# Patient Record
Sex: FEMALE | Race: BLACK OR AFRICAN AMERICAN | Employment: FULL TIME | URBAN - METROPOLITAN AREA
[De-identification: names, ages, dates, MRNs, and addresses within clinical notes are randomized per-mention and may not be internally consistent; named-entity substitution may affect disease eponyms.]

---

## 2020-11-15 ENCOUNTER — HOSPITAL ENCOUNTER (EMERGENCY)
Age: 45
Discharge: HOME | End: 2020-11-15
Attending: EMERGENCY MEDICINE
Payer: MEDICAID

## 2020-11-15 ENCOUNTER — APPOINTMENT (OUTPATIENT)
Dept: CT IMAGING | Age: 45
End: 2020-11-15
Attending: PHYSICIAN ASSISTANT
Payer: MEDICAID

## 2020-11-15 ENCOUNTER — APPOINTMENT (OUTPATIENT)
Dept: GENERAL RADIOLOGY | Age: 45
End: 2020-11-15
Attending: PHYSICIAN ASSISTANT
Payer: MEDICAID

## 2020-11-15 VITALS
TEMPERATURE: 97.3 F | HEART RATE: 65 BPM | DIASTOLIC BLOOD PRESSURE: 84 MMHG | BODY MASS INDEX: 44.9 KG/M2 | HEIGHT: 64 IN | SYSTOLIC BLOOD PRESSURE: 126 MMHG | RESPIRATION RATE: 19 BRPM | OXYGEN SATURATION: 100 % | WEIGHT: 263 LBS

## 2020-11-15 DIAGNOSIS — R07.9 CHEST PAIN, UNSPECIFIED TYPE: Primary | ICD-10-CM

## 2020-11-15 LAB
ALBUMIN SERPL-MCNC: 3.6 G/DL (ref 3.4–5)
ALBUMIN/GLOB SERPL: 0.9 {RATIO} (ref 0.8–1.7)
ALP SERPL-CCNC: 133 U/L (ref 45–117)
ALT SERPL-CCNC: 23 U/L (ref 13–56)
ANION GAP SERPL CALC-SCNC: 3 MMOL/L (ref 3–18)
AST SERPL-CCNC: 14 U/L (ref 10–38)
ATRIAL RATE: 65 BPM
BASOPHILS # BLD: 0 K/UL (ref 0–0.1)
BASOPHILS NFR BLD: 0 % (ref 0–2)
BILIRUB SERPL-MCNC: 0.7 MG/DL (ref 0.2–1)
BUN SERPL-MCNC: 13 MG/DL (ref 7–18)
BUN/CREAT SERPL: 14 (ref 12–20)
CALCIUM SERPL-MCNC: 9 MG/DL (ref 8.5–10.1)
CALCULATED P AXIS, ECG09: 20 DEGREES
CALCULATED R AXIS, ECG10: 57 DEGREES
CALCULATED T AXIS, ECG11: 18 DEGREES
CHLORIDE SERPL-SCNC: 104 MMOL/L (ref 100–111)
CK MB CFR SERPL CALC: NORMAL % (ref 0–4)
CK MB SERPL-MCNC: <1 NG/ML (ref 5–25)
CK SERPL-CCNC: 89 U/L (ref 26–192)
CO2 SERPL-SCNC: 33 MMOL/L (ref 21–32)
CREAT SERPL-MCNC: 0.91 MG/DL (ref 0.6–1.3)
D DIMER PPP FEU-MCNC: 1.18 UG/ML(FEU)
DIAGNOSIS, 93000: NORMAL
DIFFERENTIAL METHOD BLD: ABNORMAL
EOSINOPHIL # BLD: 0.1 K/UL (ref 0–0.4)
EOSINOPHIL NFR BLD: 2 % (ref 0–5)
ERYTHROCYTE [DISTWIDTH] IN BLOOD BY AUTOMATED COUNT: 14.8 % (ref 11.6–14.5)
GLOBULIN SER CALC-MCNC: 4.2 G/DL (ref 2–4)
GLUCOSE SERPL-MCNC: 89 MG/DL (ref 74–99)
HCT VFR BLD AUTO: 44 % (ref 35–45)
HGB BLD-MCNC: 14.6 G/DL (ref 12–16)
LYMPHOCYTES # BLD: 2.5 K/UL (ref 0.9–3.6)
LYMPHOCYTES NFR BLD: 29 % (ref 21–52)
MCH RBC QN AUTO: 30.5 PG (ref 24–34)
MCHC RBC AUTO-ENTMCNC: 33.2 G/DL (ref 31–37)
MCV RBC AUTO: 92.1 FL (ref 74–97)
MONOCYTES # BLD: 0.6 K/UL (ref 0.05–1.2)
MONOCYTES NFR BLD: 7 % (ref 3–10)
NEUTS SEG # BLD: 5.3 K/UL (ref 1.8–8)
NEUTS SEG NFR BLD: 62 % (ref 40–73)
P-R INTERVAL, ECG05: 178 MS
PLATELET # BLD AUTO: 336 K/UL (ref 135–420)
PMV BLD AUTO: 8.4 FL (ref 9.2–11.8)
POTASSIUM SERPL-SCNC: 3.7 MMOL/L (ref 3.5–5.5)
PROT SERPL-MCNC: 7.8 G/DL (ref 6.4–8.2)
Q-T INTERVAL, ECG07: 406 MS
QRS DURATION, ECG06: 86 MS
QTC CALCULATION (BEZET), ECG08: 422 MS
RBC # BLD AUTO: 4.78 M/UL (ref 4.2–5.3)
SODIUM SERPL-SCNC: 140 MMOL/L (ref 136–145)
TROPONIN I SERPL-MCNC: <0.02 NG/ML (ref 0–0.04)
VENTRICULAR RATE, ECG03: 65 BPM
WBC # BLD AUTO: 8.7 K/UL (ref 4.6–13.2)

## 2020-11-15 PROCEDURE — 99285 EMERGENCY DEPT VISIT HI MDM: CPT

## 2020-11-15 PROCEDURE — 74011000636 HC RX REV CODE- 636: Performed by: EMERGENCY MEDICINE

## 2020-11-15 PROCEDURE — 96374 THER/PROPH/DIAG INJ IV PUSH: CPT

## 2020-11-15 PROCEDURE — 93005 ELECTROCARDIOGRAM TRACING: CPT

## 2020-11-15 PROCEDURE — 71045 X-RAY EXAM CHEST 1 VIEW: CPT

## 2020-11-15 PROCEDURE — 80053 COMPREHEN METABOLIC PANEL: CPT

## 2020-11-15 PROCEDURE — 85025 COMPLETE CBC W/AUTO DIFF WBC: CPT

## 2020-11-15 PROCEDURE — 82550 ASSAY OF CK (CPK): CPT

## 2020-11-15 PROCEDURE — 71275 CT ANGIOGRAPHY CHEST: CPT

## 2020-11-15 PROCEDURE — 74011250636 HC RX REV CODE- 250/636: Performed by: PHYSICIAN ASSISTANT

## 2020-11-15 PROCEDURE — 85379 FIBRIN DEGRADATION QUANT: CPT

## 2020-11-15 RX ORDER — ACETAMINOPHEN AND CODEINE PHOSPHATE 300; 30 MG/1; MG/1
1 TABLET ORAL
Qty: 12 TAB | Refills: 0 | Status: SHIPPED | OUTPATIENT
Start: 2020-11-15 | End: 2020-11-18

## 2020-11-15 RX ORDER — KETOROLAC TROMETHAMINE 30 MG/ML
30 INJECTION, SOLUTION INTRAMUSCULAR; INTRAVENOUS
Status: COMPLETED | OUTPATIENT
Start: 2020-11-15 | End: 2020-11-15

## 2020-11-15 RX ADMIN — IOPAMIDOL 100 ML: 755 INJECTION, SOLUTION INTRAVENOUS at 16:15

## 2020-11-15 RX ADMIN — KETOROLAC TROMETHAMINE 30 MG: 30 INJECTION, SOLUTION INTRAMUSCULAR at 15:36

## 2020-11-15 NOTE — ED PROVIDER NOTES
EMERGENCY DEPARTMENT HISTORY AND PHYSICAL EXAM    Date: 11/15/2020  Patient Name: Dipak Daniel    History of Presenting Illness     Chief Complaint   Patient presents with    Chest Pain         History Provided By: Patient    Dipak Daniel is a 50 y.o. female with PMHX of rheumatoid and GERD who presents to the emergency department C/O chest pain. Reports 3 days of constant central chest pain feels like pressure. Patient states the pain is worsened with deep breaths. Patient states it seems to be really relieved if she leans forward. Been attempting many over-the-counter antacid medications with no relief of symptoms. Pt denies fever, URI type symptoms cough congestion nausea vomiting abdominal pain, and any other sxs or complaints. PCP: Carlos, MD Sil    Current Outpatient Medications   Medication Sig Dispense Refill    acetaminophen-codeine (Tylenol-Codeine #3) 300-30 mg per tablet Take 1 Tab by mouth every four (4) hours as needed for Pain for up to 3 days. Max Daily Amount: 6 Tabs. 12 Tab 0       Past History     Past Medical History:  Past Medical History:   Diagnosis Date    Hypothyroid        Past Surgical History:  History reviewed. No pertinent surgical history. Family History:  No family history on file. Social History:  Social History     Tobacco Use    Smoking status: Never Smoker   Substance Use Topics    Alcohol use: Never     Frequency: Never    Drug use: Never       Allergies:  No Known Allergies      Review of Systems   Review of Systems   Constitutional: Negative for fever. HENT: Negative for congestion. Respiratory: Negative for cough and shortness of breath. Cardiovascular: Positive for chest pain. Gastrointestinal: Negative for abdominal pain and vomiting. All other systems reviewed and are negative.       Physical Exam     Vitals:    11/15/20 1531 11/15/20 1535 11/15/20 1827 11/15/20 2004   BP: 126/85 126/85 139/72 124/66   Pulse: 67 63  71   Resp: 23 16 15 21 Temp:       SpO2:  100%     Weight:       Height:         Physical Exam  Vitals signs and nursing note reviewed. Constitutional:       General: She is not in acute distress. Appearance: She is well-developed. She is obese. HENT:      Head: Normocephalic and atraumatic. Eyes:      Extraocular Movements: Extraocular movements intact. Pupils: Pupils are equal, round, and reactive to light. Neck:      Musculoskeletal: Normal range of motion and neck supple. Cardiovascular:      Rate and Rhythm: Normal rate and regular rhythm. Pulmonary:      Effort: Pulmonary effort is normal.      Breath sounds: Normal breath sounds. Abdominal:      Palpations: Abdomen is soft. Tenderness: There is no abdominal tenderness. Musculoskeletal: Normal range of motion. Skin:     General: Skin is warm and dry. Capillary Refill: Capillary refill takes less than 2 seconds. Neurological:      General: No focal deficit present. Mental Status: She is alert and oriented to person, place, and time.    Psychiatric:         Mood and Affect: Mood normal.         Behavior: Behavior normal.           Diagnostic Study Results     Labs -     Recent Results (from the past 12 hour(s))   EKG, 12 LEAD, INITIAL    Collection Time: 11/15/20  2:00 PM   Result Value Ref Range    Ventricular Rate 65 BPM    Atrial Rate 65 BPM    P-R Interval 178 ms    QRS Duration 86 ms    Q-T Interval 406 ms    QTC Calculation (Bezet) 422 ms    Calculated P Axis 20 degrees    Calculated R Axis 57 degrees    Calculated T Axis 18 degrees    Diagnosis       Normal sinus rhythm  Normal ECG  No previous ECGs available  Confirmed by Juan R Meraz MD, -- ([id number]) on 11/15/2020 3:13:09 PM     CBC WITH AUTOMATED DIFF    Collection Time: 11/15/20  2:50 PM   Result Value Ref Range    WBC 8.7 4.6 - 13.2 K/uL    RBC 4.78 4.20 - 5.30 M/uL    HGB 14.6 12.0 - 16.0 g/dL    HCT 44.0 35.0 - 45.0 %    MCV 92.1 74.0 - 97.0 FL    MCH 30.5 24.0 - 34.0 PG    MCHC 33.2 31.0 - 37.0 g/dL    RDW 14.8 (H) 11.6 - 14.5 %    PLATELET 311 492 - 789 K/uL    MPV 8.4 (L) 9.2 - 11.8 FL    NEUTROPHILS 62 40 - 73 %    LYMPHOCYTES 29 21 - 52 %    MONOCYTES 7 3 - 10 %    EOSINOPHILS 2 0 - 5 %    BASOPHILS 0 0 - 2 %    ABS. NEUTROPHILS 5.3 1.8 - 8.0 K/UL    ABS. LYMPHOCYTES 2.5 0.9 - 3.6 K/UL    ABS. MONOCYTES 0.6 0.05 - 1.2 K/UL    ABS. EOSINOPHILS 0.1 0.0 - 0.4 K/UL    ABS. BASOPHILS 0.0 0.0 - 0.1 K/UL    DF AUTOMATED     METABOLIC PANEL, COMPREHENSIVE    Collection Time: 11/15/20  2:50 PM   Result Value Ref Range    Sodium 140 136 - 145 mmol/L    Potassium 3.7 3.5 - 5.5 mmol/L    Chloride 104 100 - 111 mmol/L    CO2 33 (H) 21 - 32 mmol/L    Anion gap 3 3.0 - 18 mmol/L    Glucose 89 74 - 99 mg/dL    BUN 13 7.0 - 18 MG/DL    Creatinine 0.91 0.6 - 1.3 MG/DL    BUN/Creatinine ratio 14 12 - 20      GFR est AA >60 >60 ml/min/1.73m2    GFR est non-AA >60 >60 ml/min/1.73m2    Calcium 9.0 8.5 - 10.1 MG/DL    Bilirubin, total 0.7 0.2 - 1.0 MG/DL    ALT (SGPT) 23 13 - 56 U/L    AST (SGOT) 14 10 - 38 U/L    Alk. phosphatase 133 (H) 45 - 117 U/L    Protein, total 7.8 6.4 - 8.2 g/dL    Albumin 3.6 3.4 - 5.0 g/dL    Globulin 4.2 (H) 2.0 - 4.0 g/dL    A-G Ratio 0.9 0.8 - 1.7     CARDIAC PANEL,(CK, CKMB & TROPONIN)    Collection Time: 11/15/20  2:50 PM   Result Value Ref Range    CK - MB <1.0 <3.6 ng/ml    CK-MB Index  0.0 - 4.0 %     CALCULATION NOT PERFORMED WHEN RESULT IS BELOW LINEAR LIMIT    CK 89 26 - 192 U/L    Troponin-I, QT <0.02 0.0 - 0.045 NG/ML   D DIMER    Collection Time: 11/15/20  2:50 PM   Result Value Ref Range    D DIMER 1.18 (H) <0.46 ug/ml(FEU)       Radiologic Studies -   CTA CHEST W OR W WO CONT   Final Result   IMPRESSION:      No evidence of a pulmonary embolism or aortic dissection. Hepatic steatosis. Gastric bypass changes. XR CHEST PORT   Final Result   IMPRESSION:      No acute radiographic cardiopulmonary abnormality.          CT Results  (Last 48 hours) 11/15/20 2001  CTA CHEST W OR W WO CONT Final result    Impression:  IMPRESSION:       No evidence of a pulmonary embolism or aortic dissection. Hepatic steatosis. Gastric bypass changes. Narrative:  EXAM: CTA chest       INDICATION: Chest pain       COMPARISON: None       TECHNIQUE: Axial CT imaging from the thoracic inlet through the diaphragm with   intravenous contrast. Coronal and sagittal MIP reformats were generated. One or   more dose reduction techniques were used on this CT: automated exposure control,   adjustment of the mAs and/or kVp according to patient size, and iterative   reconstruction techniques. The specific techniques used on this CT exam have   been documented in the patient's electronic medical record. Digital Imaging and   Communications in Medicine (DICOM) format image data are available to   nonaffiliated external healthcare facilities or entities on a secure, media   free, reciprocally searchable basis with patient authorization for at least a   12-month period after this study. _______________       FINDINGS:       EXAM QUALITY: Overall exam quality is satisfactory. Pulmonary arterial   enhancement is adequate with adequate breath hold and no significant artifact. PULMONARY ARTERIES: No evidence of pulmonary embolism. LYMPH Nodes: No enlarged lymph nodes seen. PLEURA: There are no pleural effusions. HEART: Normal in size without pericardial effusion. VASCULATURE/MEDIASTINUM: There is no aortic dissection. Unremarkable otherwise. LUNGS: No suspicious nodule or mass. No abnormal opacities. AIRWAY: Normal.       UPPER ABDOMEN: There is hepatic steatosis. No focal hepatic lesion seen. Gastric   bypass changes present. There is 11 mm lipoma in the antrum of the stomach. OTHER: No acute or aggressive osseous abnormalities identified.        _______________               CXR Results  (Last 48 hours) 11/15/20 1437  XR CHEST PORT Final result    Impression:  IMPRESSION:       No acute radiographic cardiopulmonary abnormality. Narrative:  EXAM: XR CHEST PORT       CLINICAL INDICATION/HISTORY: Chest pain   -Additional: None       COMPARISON: None       TECHNIQUE: Frontal view of the chest       _______________       FINDINGS:       HEART AND MEDIASTINUM: Normal cardiac size and mediastinal contours. LUNGS AND PLEURAL SPACES: No focal pneumonic consolidation, pneumothorax, or   pleural effusion. BONY THORAX AND SOFT TISSUES: No acute osseous abnormality       _______________                 Medications given in the ED-  Medications   ketorolac (TORADOL) injection 30 mg (30 mg IntraVENous Given 11/15/20 1536)   iopamidoL (ISOVUE-370) 76 % injection  mL (100 mL IntraVENous Given 11/15/20 1615)         Medical Decision Making   I am the first provider for this patient. I reviewed the vital signs, available nursing notes, past medical history, past surgical history, family history and social history. Vital Signs-Reviewed the patient's vital signs. Pulse Oximetry Analysis - 100% on RA     Records Reviewed: Nursing Notes    Procedures:  Procedures    ED Course:   3:01 PM   Initial assessment performed. The patients presenting problems have been discussed, and they are in agreement with the care plan formulated and outlined with them. I have encouraged them to ask questions as they arise throughout their visit. 8:35 PM  Doing better with Toradol. Pain much improved. Vital signs have remained stable. Discharge    Discussion: 50 y.o. female 3 of GERD rheumatoid arthritis complaining of 3 days of constant pressure-like chest pain headache in nature. Patient afebrile she is appropriate vital signs laboratory findings remarkable for normal CBC and CMP negative cardiac panel, normal EKG, chest x-ray without acute abnormality. However increased D-dimer with negative CTA.   Patient feeling better with Toradol. Likely musculoskeletal chest pain. Will plan for discharge pain control PCP follow-up and return precautions discussed. Diagnosis and Disposition       DISCHARGE NOTE:  Eric Ramirez  results have been reviewed with her. She has been counseled regarding her diagnosis, treatment, and plan. She verbally conveys understanding and agreement of the signs, symptoms, diagnosis, treatment and prognosis and additionally agrees to follow up as discussed. She also agrees with the care-plan and conveys that all of her questions have been answered. I have also provided discharge instructions for her that include: educational information regarding their diagnosis and treatment, and list of reasons why they would want to return to the ED prior to their follow-up appointment, should her condition change. She has been provided with education for proper emergency department utilization. CLINICAL IMPRESSION:    1. Chest pain, unspecified type        PLAN:  1. D/C Home  2. Current Discharge Medication List      START taking these medications    Details   acetaminophen-codeine (Tylenol-Codeine #3) 300-30 mg per tablet Take 1 Tab by mouth every four (4) hours as needed for Pain for up to 3 days. Max Daily Amount: 6 Tabs. Qty: 12 Tab, Refills: 0    Associated Diagnoses: Chest pain, unspecified type           3. Follow-up Information     Follow up With Specialties Details Why Contact Info    your PCP  Schedule an appointment as soon as possible for a visit      [facility] EMERGENCY DEPT Emergency Medicine  As needed, If symptoms worsen [address] [id number] [phone]                  Please note that this dictation was completed with [employer], the computer voice recognition software. Quite often unanticipated grammatical, syntax, homophones, and other interpretive errors are inadvertently transcribed by the computer software. Please disregard these errors.   Please excuse any errors that have escaped final proofreading.

## 2020-11-16 NOTE — DISCHARGE INSTRUCTIONS

## 2021-03-10 ENCOUNTER — OFFICE VISIT (OUTPATIENT)
Dept: SURGERY | Age: 46
End: 2021-03-10
Payer: MEDICAID

## 2021-03-10 VITALS
RESPIRATION RATE: 16 BRPM | OXYGEN SATURATION: 100 % | SYSTOLIC BLOOD PRESSURE: 132 MMHG | WEIGHT: 286.1 LBS | HEIGHT: 64 IN | DIASTOLIC BLOOD PRESSURE: 78 MMHG | BODY MASS INDEX: 48.84 KG/M2 | HEART RATE: 68 BPM

## 2021-03-10 DIAGNOSIS — E03.9 HYPOTHYROIDISM, UNSPECIFIED TYPE: ICD-10-CM

## 2021-03-10 DIAGNOSIS — E66.01 MORBID OBESITY (HCC): ICD-10-CM

## 2021-03-10 DIAGNOSIS — Z98.84 STATUS POST GASTRIC BYPASS FOR OBESITY: ICD-10-CM

## 2021-03-10 DIAGNOSIS — E03.9 ATHYROIDISM (ACQUIRED): ICD-10-CM

## 2021-03-10 DIAGNOSIS — R63.5 WEIGHT GAIN: ICD-10-CM

## 2021-03-10 DIAGNOSIS — E66.01 MORBID OBESITY WITH BODY MASS INDEX (BMI) OF 40.0 TO 49.9 (HCC): ICD-10-CM

## 2021-03-10 DIAGNOSIS — K90.9 INTESTINAL MALABSORPTION, UNSPECIFIED TYPE: Primary | ICD-10-CM

## 2021-03-10 DIAGNOSIS — K21.9 GASTROESOPHAGEAL REFLUX DISEASE, UNSPECIFIED WHETHER ESOPHAGITIS PRESENT: ICD-10-CM

## 2021-03-10 DIAGNOSIS — Z87.891 SMOKING HISTORY: ICD-10-CM

## 2021-03-10 PROCEDURE — 99205 OFFICE O/P NEW HI 60 MIN: CPT | Performed by: SPECIALIST

## 2021-03-10 RX ORDER — CITALOPRAM 10 MG/1
10 TABLET ORAL DAILY
COMMUNITY

## 2021-03-10 RX ORDER — FAMOTIDINE 40 MG/1
40 TABLET, FILM COATED ORAL DAILY
COMMUNITY

## 2021-03-10 RX ORDER — MAGNESIUM 200 MG
1000 TABLET ORAL DAILY
COMMUNITY
Start: 2020-01-08

## 2021-03-10 RX ORDER — ASCORBIC ACID 500 MG
TABLET ORAL
COMMUNITY

## 2021-03-10 RX ORDER — OMEPRAZOLE 40 MG/1
40 CAPSULE, DELAYED RELEASE ORAL
COMMUNITY

## 2021-03-10 RX ORDER — LEVOTHYROXINE SODIUM 125 UG/1
125 TABLET ORAL
COMMUNITY

## 2021-03-10 RX ORDER — VITAMIN E CAP 100 UNIT 100 UNIT
CAP ORAL DAILY
COMMUNITY
End: 2021-06-14

## 2021-03-10 RX ORDER — ERGOCALCIFEROL 1.25 MG/1
CAPSULE ORAL
COMMUNITY

## 2021-03-10 NOTE — PROGRESS NOTES
Revision Surgery Consultation    Subjective: The patient is a 50 y.o. obese female with a Body mass index is 49.11 kg/m². .  The patient had a laparoscopic gastric bypass procedure done approximatly 16 years ago at [facility] by a Dr Nazanin Chawla. her starting weight prior to surgery was 380 lbs. she ultimately lost approximately 135 lbs with a subsequent weight regain of 41 lbs. She goes on to say that she never had any real bariatric follow-up. Lisandra Hernandez notes that she had no issues in the immediate post-op phase and had no hospital readmissions in the remote post-op phase. she currently is having the following issues related to his health: worsening arthritic like pain in her knee that is threatening a replacement. she is here today to discuss a possible revision of her gastric bypass because of arthritis and weight regain. All of their prior evaluations available by both their PCP's and specialists physicians have been reviewed today either in the Care Everywhere portal or scanned under the media tab. I have spent a large portion of my initial consultation today reviewing the patients current dietary habits which have contributed to their health issues, weight regain and  their current obesity. They understand that generally speaking,  weight regain is  a function of resuming less that ideal dietary habits instead of being a procedural issue. I have suggested to them personally a dietary regimen that they can initiate now to help with their status as it pertains to their weight. They understand that the most important aspect of their journey through their weight loss endeavor will be their adherence to a new lifestyle of healthy eating behavior. They also understand that an adherence to an exercise program will not only help with weight loss but is ultimately important in weight maintenance.     Patient Active Problem List    Diagnosis Date Noted    Hypothyroid     Intestinal malabsorption  Anxiety     GERD (gastroesophageal reflux disease)     Athyroidism (acquired)     Morbid obesity (HCC)     Morbid obesity with body mass index (BMI) of 40.0 to 49.9 (HCC)     Smoking history     Status post gastric bypass for obesity       Past Surgical History:   Procedure Laterality Date    HX LAP GASTRIC BYPASS      [facility]    HX ORTHOPAEDIC      right knee surgery    HX THYROIDECTOMY      HX TUBAL LIGATION        Social History     Tobacco Use    Smoking status: Former Smoker     Quit date:      Years since quittin.2    Smokeless tobacco: Never Used   Substance Use Topics    Alcohol use: Yes     Frequency: Monthly or less      Family History   Problem Relation Age of Onset    Heart Disease Father       Current Outpatient Medications   Medication Sig Dispense Refill    levothyroxine (SYNTHROID) 125 mcg tablet Take 125 mcg by mouth Daily (before breakfast).  citalopram (CELEXA) 10 mg tablet Take 10 mg by mouth daily.  omeprazole (PRILOSEC) 40 mg capsule Take 40 mg by mouth daily.  famotidine (PEPCID) 40 mg tablet Take 40 mg by mouth daily.  ergocalciferol (ERGOCALCIFEROL) 1,250 mcg (50,000 unit) capsule Vitamin D2 1,250 mcg (50,000 unit) capsule      cyanocobalamin (VITAMIN B-12) 1,000 mcg sublingual tablet 1,000 mcg by SubLINGual route daily.  ascorbic acid, vitamin C, (Vitamin C) 500 mg tablet Take  by mouth.  vitamin e (E GEMS) 100 unit capsule Take  by mouth daily.        Allergies   Allergen Reactions    Tylenol-Codeine #3 [Acetaminophen-Codeine] Other (comments)          Review of Systems:        General - No history or complaints of unexpected fever, chills, or weight loss  Head/Neck - No history or complaints of headache, diplopia, dysphagia, hearing loss  Cardiac - No history or complaints of chest pain, palpitations, murmur, or shortness of breath  Pulmonary - No history or complaints of shortness of breath, productive cough, hemoptysis  Gastrointestinal - No history or complaints of reflux,  abdominal pain, obstipation/constipation, blood per rectum  Genitourinary - No history or complaints of hematuria/dysuria, stress urinary incontinence symptoms, or renal lithiasis  Musculoskeletal - No history or complaints of muscular weakness  Hematologic - No history or complaints of bleeding disorders, blood transfusions, sickle cell anemia  Neurologic - No history or complaints of  migraine headaches, seizure activity, syncopal episodes, TIA or stroke  Integumentary - No history or complaints of rashes, abnormal nevi, skin cancer  Gynecological - No history of heavy menses/abnormal menses    Objective:     Visit Vitals  /78 (BP 1 Location: Left arm, BP Patient Position: Sitting)   Pulse 68   Resp 16   Ht 5' 4\" (1.626 m)   Wt 129.8 kg (286 lb 1.6 oz)   SpO2 100%   BMI 49.11 kg/m²       Physical Examination: General appearance - alert, well appearing, and in no distress  Mental status - alert, oriented to person, place, and time  Eyes - pupils equal and reactive, extraocular eye movements intact  Ears - bilateral TM's and external ear canals normal  Nose - normal and patent, no erythema, discharge or polyps  Mouth - mucous membranes moist, pharynx normal without lesions  Neck - supple, no significant adenopathy  Lymphatics - no palpable lymphadenopathy, no hepatosplenomegaly  Chest - clear to auscultation, no wheezes, rales or rhonchi, symmetric air entry  Heart - normal rate, regular rhythm, normal S1, S2, no murmurs, rubs, clicks or gallops  Abdomen - soft, nontender, nondistended, no masses or organomegaly  Back exam - full range of motion, no tenderness, palpable spasm or pain on motion  Neurological - alert, oriented, normal speech, no focal findings or movement disorder noted  Musculoskeletal - no joint tenderness, deformity or swelling  Extremities - peripheral pulses normal, no pedal edema, no clubbing or cyanosis  Skin - normal coloration and turgor, no rashes, no suspicious skin lesions noted    Labs / Old Records:     Lab Results   Component Value Date/Time    WBC 8.7 11/15/2020 02:50 PM    HGB 14.6 11/15/2020 02:50 PM    HCT 44.0 11/15/2020 02:50 PM    PLATELET 289 96/00/5101 02:50 PM    MCV 92.1 11/15/2020 02:50 PM     Lab Results   Component Value Date/Time    Sodium 140 11/15/2020 02:50 PM    Potassium 3.7 11/15/2020 02:50 PM    Chloride 104 11/15/2020 02:50 PM    CO2 33 (H) 11/15/2020 02:50 PM    Anion gap 3 11/15/2020 02:50 PM    Glucose 89 11/15/2020 02:50 PM    BUN 13 11/15/2020 02:50 PM    Creatinine 0.91 11/15/2020 02:50 PM    BUN/Creatinine ratio 14 11/15/2020 02:50 PM    GFR est AA >60 11/15/2020 02:50 PM    GFR est non-AA >60 11/15/2020 02:50 PM    Calcium 9.0 11/15/2020 02:50 PM    Bilirubin, total 0.7 11/15/2020 02:50 PM    Alk. phosphatase 133 (H) 11/15/2020 02:50 PM    Protein, total 7.8 11/15/2020 02:50 PM    Albumin 3.6 11/15/2020 02:50 PM    Globulin 4.2 (H) 11/15/2020 02:50 PM    A-G Ratio 0.9 11/15/2020 02:50 PM    ALT (SGPT) 23 11/15/2020 02:50 PM     No results found for: IRON, FE, TIBC, IBCT, PSAT, FERR  No results found for: FOL, RBCF  No results found for: VITD3, XQVID2, XQVID3, XQVID, VD3RIA          Old operative reports reviewed if available and are scanned under the media tab or reviewed under Care Everywhere        Assessment:     Morbid obesity status post laparoscopic gastric bypass procedure approximately 16 years ago in [facility] with complaint of weight regain. After today's visit she understands that no decision can be made until further evaluation of her anatomy has been performed.   She leaves here today knowing that she possibly has one of two options;    1- revision of some aspects of her gastric bypass    2 - band over gastric bypass    Yearly labs have been ordered today    As I discussed with the patient today, her prior operative procedure is not available due to the length of time since that procedure. I have explained to her that there are certainly unknowns related to her surgery which likely will affect our ability to perform the aforementioned procedures. She does understand that we will assess her gastric pouch via upper GI study and have her initiate a medical weight loss program in hopes of assisting her with weight loss surgically. Plan: 1. Weight regain-Today in our office I had a lengthy discussion with Flower Alvarez regarding the nature of their prior procedure. We discussed the anatomical changes to their anatomy and how this relates to  contributing weight regain. Our office will continue to attempt to obtain any medical records related to their procedure if we were not able to obtain theme today. It was also discussed today that before any decisions can be made regarding a possible revision of their initial  procedure that an upper GI swallow study must be obtained to evaluate their post surgical anatomy. The factors that contribute to this are increased risk such as age, health issues and increased risk from a procedural standpoint and have been discussed today. We will proceed with the UGI swallow study as described above. The patient understands all of the above and wishes to proceed with the study. 2.Nutrition-  I have discussed in detail the pitfalls in diet that have contributed to their weight regain and the importance of adhering to a lifelong regimen of dietary goals and proper eating habits. I have discussed the proper lifelong bariatric diet  in detail spending in excess of 20 minutes discussing this. We will schedule them for a dietary consultation with our nutritionist and urge them to continue on a regular follow-up schedule with her. 3.Maintenance vitamins- Today we have discussed the importance of vitamins as it pertains to their procedure and we will obtain appropriate lab to check all levels.  They have been provided a handout regarding this today.      Total time spent with the patient reviewing their complex history of bariatric surgery,diet, and plan is in excess of 60 minutes.       Secondary Diagnoses:         Signed By: Isma Machado MD     March 10, 2021

## 2021-03-10 NOTE — PATIENT INSTRUCTIONS
Patient Instructions 1. Continue to monitor carbohydrate and protein intake- remember to keep your           total  carbohydrates to 50 grams or less per day for best results. 2. Remember hydration goals - usually 48 to 64 ounces of liquids per day 3. Continue to work towards exercise goals - minimum 3 days per week of 45          minutes to  1 hour at a time. 4. Remember to take vitamins as directed Supplement Resource Guide Importance of Protein:  
Maintains lean body mass, produces antibodies to fight off infections, heals wounds, minimizes hair loss, helps to give you energy, helps with satiety, and keeping you full between meals. Importance of Calcium: 
Needed for healthy bones and teeth, normal blood clotting, and nervous system functioning, higher risk of osteoporosis and bone disease with non-compliance. Importance of Multivitamins: Many functions. Supply you with extra nutrients that you may be missing from food. May lead to iron deficiency anemia, weakness, fatigue, and many other symptoms with non-compliance. Importance of B Vitamins: 
Important for red blood cell formation, metabolism, energy, and helps to maintain a healthy nervous system. Protein Supplement Find one you like now. Use immediately after surgery. Look for: 
35-50g protein each day from your protein supplement once you reach the progression diet. 0-3 g fat per serving 0-3 g sugar per serving Protein drinks should be split in separate dosages. Recommend: Lifelong 1 year + Calcium Supplement:  
 
Start taking within a month after surgery. Look for: Calcium Citrate Plus D (1500 mg per day) Recommend: Citracal 
 
 . Avoid chocolate chewable calcium. Can use chewable bariatric or [employer] brand or similar chewable. The body cannot absorb more than 500-600 mg @ a time.  
 
 
Take for Life Multi-vitamin Supplement:   
 
1st Month After Surgery: Any complete chewable, such as: [employer]views Complete chewables. Avoid [employer]view sours or gummies. They lack iron and other important nutrients and also have added sugar. Continue with chewable vitamin or change to adult complete multivitamin one month after surgery. Menstruating women can take a prenatal vitamin. Make sure has at least 18 mg iron and 275-808 mcg folic acid): Vitamin B12, B Complex Vitamin, and Biotin Start taking within a month after surgery. Vitamin B12:  1000 mcg of Vitamin B12 three times weekly Must take sublingually (meaning you take it under your tongue) or in a liquid drop form for easy absorption. B Complex Vitamin: Take a pill or liquid drop form once daily. Biotin: This vitamin can help prevent hair loss. Recommend 5mg  
(5000 mcg) a day Biotin is Optional

## 2021-04-07 ENCOUNTER — DOCUMENTATION ONLY (OUTPATIENT)
Dept: BARIATRICS/WEIGHT MGMT | Age: 46
End: 2021-04-07

## 2021-04-07 NOTE — PROGRESS NOTES
4/7/21:  Patient did not show for her nutrition assessment. Patient was left a voicemail asking to call me to reschedule.     Beacher Libman, MS RD

## 2021-05-06 ENCOUNTER — TRANSCRIBE ORDER (OUTPATIENT)
Dept: SCHEDULING | Age: 46
End: 2021-05-06

## 2021-05-06 DIAGNOSIS — M25.561 RIGHT KNEE PAIN: Primary | ICD-10-CM

## 2021-05-24 ENCOUNTER — HOSPITAL ENCOUNTER (OUTPATIENT)
Dept: CT IMAGING | Age: 46
Discharge: HOME | End: 2021-05-24
Attending: ORTHOPAEDIC SURGERY
Payer: MEDICAID

## 2021-05-24 DIAGNOSIS — M25.561 RIGHT KNEE PAIN: ICD-10-CM

## 2021-05-24 PROCEDURE — 73700 CT LOWER EXTREMITY W/O DYE: CPT

## 2021-06-21 ENCOUNTER — TRANSCRIBE ORDER (OUTPATIENT)
Dept: REGISTRATION | Age: 46
End: 2021-06-21

## 2021-06-21 ENCOUNTER — HOSPITAL ENCOUNTER (OUTPATIENT)
Dept: LAB | Age: 46
Discharge: HOME | End: 2021-06-21

## 2021-06-21 ENCOUNTER — HOSPITAL ENCOUNTER (OUTPATIENT)
Dept: PREADMISSION TESTING | Age: 46
Discharge: HOME | End: 2021-06-21
Payer: MEDICAID

## 2021-06-21 DIAGNOSIS — Z01.818 PREOPERATIVE EXAMINATION, UNSPECIFIED: ICD-10-CM

## 2021-06-21 DIAGNOSIS — Z01.818 PREOPERATIVE EXAMINATION, UNSPECIFIED: Primary | ICD-10-CM

## 2021-06-21 LAB
ATRIAL RATE: 59 BPM
CALCULATED P AXIS, ECG09: 43 DEGREES
CALCULATED R AXIS, ECG10: 93 DEGREES
CALCULATED T AXIS, ECG11: 43 DEGREES
DIAGNOSIS, 93000: NORMAL
P-R INTERVAL, ECG05: 180 MS
Q-T INTERVAL, ECG07: 394 MS
QRS DURATION, ECG06: 80 MS
QTC CALCULATION (BEZET), ECG08: 390 MS
VENTRICULAR RATE, ECG03: 59 BPM
XX-LABCORP SPECIMEN COL,LCBCF: NORMAL

## 2021-06-21 PROCEDURE — 99001 SPECIMEN HANDLING PT-LAB: CPT

## 2021-06-21 PROCEDURE — 93005 ELECTROCARDIOGRAM TRACING: CPT

## 2021-07-06 ENCOUNTER — ANESTHESIA EVENT (OUTPATIENT)
Dept: SURGERY | Age: 46
DRG: 302 | End: 2021-07-06
Payer: MEDICAID

## 2021-07-07 ENCOUNTER — HOSPITAL ENCOUNTER (OUTPATIENT)
Dept: VASCULAR SURGERY | Age: 46
Setting detail: OBSERVATION
Discharge: HOME | DRG: 302 | End: 2021-07-07
Attending: INTERNAL MEDICINE | Admitting: ORTHOPAEDIC SURGERY
Payer: MEDICAID

## 2021-07-07 ENCOUNTER — ANESTHESIA (OUTPATIENT)
Dept: SURGERY | Age: 46
DRG: 302 | End: 2021-07-07
Payer: MEDICAID

## 2021-07-07 ENCOUNTER — HOSPITAL ENCOUNTER (INPATIENT)
Age: 46
LOS: 5 days | Discharge: HOME HEALTH | DRG: 302 | End: 2021-07-12
Attending: ORTHOPAEDIC SURGERY | Admitting: ORTHOPAEDIC SURGERY
Payer: MEDICAID

## 2021-07-07 ENCOUNTER — APPOINTMENT (OUTPATIENT)
Dept: GENERAL RADIOLOGY | Age: 46
DRG: 302 | End: 2021-07-07
Attending: INTERNAL MEDICINE
Payer: MEDICAID

## 2021-07-07 DIAGNOSIS — M17.10 PRIMARY OSTEOARTHRITIS OF KNEE, UNSPECIFIED LATERALITY: Primary | ICD-10-CM

## 2021-07-07 PROBLEM — M17.9 DJD (DEGENERATIVE JOINT DISEASE) OF KNEE: Status: ACTIVE | Noted: 2021-07-07

## 2021-07-07 PROBLEM — R41.89 UNRESPONSIVENESS: Status: ACTIVE | Noted: 2021-07-07

## 2021-07-07 LAB
ABO + RH BLD: NORMAL
ALBUMIN SERPL-MCNC: 2.5 G/DL (ref 3.4–5)
ALBUMIN SERPL-MCNC: 3.1 G/DL (ref 3.4–5)
ALBUMIN/GLOB SERPL: 0.9 {RATIO} (ref 0.8–1.7)
ALBUMIN/GLOB SERPL: 0.9 {RATIO} (ref 0.8–1.7)
ALP SERPL-CCNC: 67 U/L (ref 45–117)
ALP SERPL-CCNC: 82 U/L (ref 45–117)
ALT SERPL-CCNC: 13 U/L (ref 13–56)
ALT SERPL-CCNC: 16 U/L (ref 13–56)
ANION GAP SERPL CALC-SCNC: 6 MMOL/L (ref 3–18)
ANION GAP SERPL CALC-SCNC: 8 MMOL/L (ref 3–18)
ARTERIAL PATENCY WRIST A: POSITIVE
AST SERPL-CCNC: 12 U/L (ref 10–38)
AST SERPL-CCNC: 13 U/L (ref 10–38)
ATRIAL RATE: 64 BPM
BASE DEFICIT BLD-SCNC: 2 MMOL/L
BDY SITE: ABNORMAL
BILIRUB SERPL-MCNC: 0.4 MG/DL (ref 0.2–1)
BILIRUB SERPL-MCNC: 0.6 MG/DL (ref 0.2–1)
BLOOD GROUP ANTIBODIES SERPL: NORMAL
BUN SERPL-MCNC: 12 MG/DL (ref 7–18)
BUN SERPL-MCNC: 12 MG/DL (ref 7–18)
BUN/CREAT SERPL: 13 (ref 12–20)
BUN/CREAT SERPL: 21 (ref 12–20)
CALCIUM SERPL-MCNC: 6.6 MG/DL (ref 8.5–10.1)
CALCIUM SERPL-MCNC: 8.3 MG/DL (ref 8.5–10.1)
CALCULATED P AXIS, ECG09: 46 DEGREES
CALCULATED R AXIS, ECG10: 44 DEGREES
CALCULATED T AXIS, ECG11: 17 DEGREES
CHLORIDE SERPL-SCNC: 107 MMOL/L (ref 100–111)
CHLORIDE SERPL-SCNC: 113 MMOL/L (ref 100–111)
CK MB CFR SERPL CALC: NORMAL % (ref 0–4)
CK MB SERPL-MCNC: <1 NG/ML (ref 5–25)
CK SERPL-CCNC: 94 U/L (ref 26–192)
CO2 SERPL-SCNC: 22 MMOL/L (ref 21–32)
CO2 SERPL-SCNC: 27 MMOL/L (ref 21–32)
CREAT SERPL-MCNC: 0.58 MG/DL (ref 0.6–1.3)
CREAT SERPL-MCNC: 0.96 MG/DL (ref 0.6–1.3)
D DIMER PPP FEU-MCNC: 3.62 UG/ML(FEU)
DIAGNOSIS, 93000: NORMAL
ERYTHROCYTE [DISTWIDTH] IN BLOOD BY AUTOMATED COUNT: 15.9 % (ref 11.6–14.5)
GAS FLOW.O2 O2 DELIVERY SYS: ABNORMAL L/MIN
GLOBULIN SER CALC-MCNC: 2.7 G/DL (ref 2–4)
GLOBULIN SER CALC-MCNC: 3.3 G/DL (ref 2–4)
GLUCOSE BLD STRIP.AUTO-MCNC: 200 MG/DL (ref 70–110)
GLUCOSE SERPL-MCNC: 125 MG/DL (ref 74–99)
GLUCOSE SERPL-MCNC: 143 MG/DL (ref 74–99)
HCG UR QL: NEGATIVE
HCO3 BLD-SCNC: 23.4 MMOL/L (ref 22–26)
HCT VFR BLD AUTO: 32.9 % (ref 35–45)
HGB BLD-MCNC: 10.8 G/DL (ref 12–16)
INR PPP: 1.3 (ref 0.8–1.2)
LACTATE SERPL-SCNC: 2.5 MMOL/L (ref 0.4–2)
MAGNESIUM SERPL-MCNC: 1.7 MG/DL (ref 1.6–2.6)
MCH RBC QN AUTO: 30.9 PG (ref 24–34)
MCHC RBC AUTO-ENTMCNC: 32.8 G/DL (ref 31–37)
MCV RBC AUTO: 94.3 FL (ref 74–97)
O2/TOTAL GAS SETTING VFR VENT: 2 %
P-R INTERVAL, ECG05: 170 MS
PCO2 BLD: 41.4 MMHG (ref 35–45)
PH BLD: 7.36 [PH] (ref 7.35–7.45)
PLATELET # BLD AUTO: 301 K/UL (ref 135–420)
PMV BLD AUTO: 8.8 FL (ref 9.2–11.8)
PO2 BLD: 104 MMHG (ref 80–100)
POTASSIUM SERPL-SCNC: 3.5 MMOL/L (ref 3.5–5.5)
POTASSIUM SERPL-SCNC: 3.8 MMOL/L (ref 3.5–5.5)
PROT SERPL-MCNC: 5.2 G/DL (ref 6.4–8.2)
PROT SERPL-MCNC: 6.4 G/DL (ref 6.4–8.2)
PROTHROMBIN TIME: 15.8 SEC (ref 11.5–15.2)
Q-T INTERVAL, ECG07: 420 MS
QRS DURATION, ECG06: 84 MS
QTC CALCULATION (BEZET), ECG08: 433 MS
RBC # BLD AUTO: 3.49 M/UL (ref 4.2–5.3)
SAO2 % BLD: 97.8 % (ref 92–97)
SERVICE CMNT-IMP: ABNORMAL
SODIUM SERPL-SCNC: 140 MMOL/L (ref 136–145)
SODIUM SERPL-SCNC: 143 MMOL/L (ref 136–145)
SPECIMEN EXP DATE BLD: NORMAL
SPECIMEN TYPE: ABNORMAL
T4 FREE SERPL-MCNC: 1 NG/DL (ref 0.7–1.5)
TROPONIN I SERPL-MCNC: <0.02 NG/ML (ref 0–0.04)
TSH SERPL DL<=0.05 MIU/L-ACNC: 1.99 UIU/ML (ref 0.36–3.74)
VENTRICULAR RATE, ECG03: 64 BPM
WBC # BLD AUTO: 14.3 K/UL (ref 4.6–13.2)

## 2021-07-07 PROCEDURE — 81025 URINE PREGNANCY TEST: CPT

## 2021-07-07 PROCEDURE — 85610 PROTHROMBIN TIME: CPT

## 2021-07-07 PROCEDURE — 76210000017 HC OR PH I REC 1.5 TO 2 HR: Performed by: ORTHOPAEDIC SURGERY

## 2021-07-07 PROCEDURE — 74011250636 HC RX REV CODE- 250/636: Performed by: NURSE ANESTHETIST, CERTIFIED REGISTERED

## 2021-07-07 PROCEDURE — 97116 GAIT TRAINING THERAPY: CPT

## 2021-07-07 PROCEDURE — 77030038010: Performed by: ORTHOPAEDIC SURGERY

## 2021-07-07 PROCEDURE — 97167 OT EVAL HIGH COMPLEX 60 MIN: CPT

## 2021-07-07 PROCEDURE — 77030002966 HC SUT PDS J&J -A: Performed by: ORTHOPAEDIC SURGERY

## 2021-07-07 PROCEDURE — 77030022295 HC SEAL BPLR VSL DISP MEDT -F: Performed by: ORTHOPAEDIC SURGERY

## 2021-07-07 PROCEDURE — 77030011628: Performed by: ORTHOPAEDIC SURGERY

## 2021-07-07 PROCEDURE — 74011250637 HC RX REV CODE- 250/637: Performed by: ANESTHESIOLOGY

## 2021-07-07 PROCEDURE — 77030020813 HC INST SCULP CEM KT DISP S&N -B: Performed by: ORTHOPAEDIC SURGERY

## 2021-07-07 PROCEDURE — 36415 COLL VENOUS BLD VENIPUNCTURE: CPT

## 2021-07-07 PROCEDURE — 74011000250 HC RX REV CODE- 250: Performed by: NURSE ANESTHETIST, CERTIFIED REGISTERED

## 2021-07-07 PROCEDURE — 85027 COMPLETE CBC AUTOMATED: CPT

## 2021-07-07 PROCEDURE — 77030020269 HC MISC IMPL: Performed by: ORTHOPAEDIC SURGERY

## 2021-07-07 PROCEDURE — C1713 ANCHOR/SCREW BN/BN,TIS/BN: HCPCS | Performed by: ORTHOPAEDIC SURGERY

## 2021-07-07 PROCEDURE — 77030012508 HC MSK AIRWY LMA AMBU -A: Performed by: ANESTHESIOLOGY

## 2021-07-07 PROCEDURE — 74011250636 HC RX REV CODE- 250/636

## 2021-07-07 PROCEDURE — 84443 ASSAY THYROID STIM HORMONE: CPT

## 2021-07-07 PROCEDURE — 99218 HC RM OBSERVATION: CPT

## 2021-07-07 PROCEDURE — 74011250636 HC RX REV CODE- 250/636: Performed by: HOSPITALIST

## 2021-07-07 PROCEDURE — 77030000032 HC CUF TRNQT ZIMM -B: Performed by: ORTHOPAEDIC SURGERY

## 2021-07-07 PROCEDURE — 76010000131 HC OR TIME 2 TO 2.5 HR: Performed by: ORTHOPAEDIC SURGERY

## 2021-07-07 PROCEDURE — 36600 WITHDRAWAL OF ARTERIAL BLOOD: CPT

## 2021-07-07 PROCEDURE — 97535 SELF CARE MNGMENT TRAINING: CPT

## 2021-07-07 PROCEDURE — 76060000035 HC ANESTHESIA 2 TO 2.5 HR: Performed by: ORTHOPAEDIC SURGERY

## 2021-07-07 PROCEDURE — 82962 GLUCOSE BLOOD TEST: CPT

## 2021-07-07 PROCEDURE — 74011250636 HC RX REV CODE- 250/636: Performed by: ANESTHESIOLOGY

## 2021-07-07 PROCEDURE — 85379 FIBRIN DEGRADATION QUANT: CPT

## 2021-07-07 PROCEDURE — 82553 CREATINE MB FRACTION: CPT

## 2021-07-07 PROCEDURE — 74011250637 HC RX REV CODE- 250/637: Performed by: ORTHOPAEDIC SURGERY

## 2021-07-07 PROCEDURE — 3E0T33Z INTRODUCTION OF ANTI-INFLAMMATORY INTO PERIPHERAL NERVES AND PLEXI, PERCUTANEOUS APPROACH: ICD-10-PCS | Performed by: ANESTHESIOLOGY

## 2021-07-07 PROCEDURE — 93005 ELECTROCARDIOGRAM TRACING: CPT

## 2021-07-07 PROCEDURE — 77030037714 HC CLOSR DEV INCIS ZIP STRY -C: Performed by: ORTHOPAEDIC SURGERY

## 2021-07-07 PROCEDURE — 2709999900 HC NON-CHARGEABLE SUPPLY: Performed by: ORTHOPAEDIC SURGERY

## 2021-07-07 PROCEDURE — 74011000250 HC RX REV CODE- 250: Performed by: ANESTHESIOLOGY

## 2021-07-07 PROCEDURE — 74011250636 HC RX REV CODE- 250/636: Performed by: ORTHOPAEDIC SURGERY

## 2021-07-07 PROCEDURE — 84439 ASSAY OF FREE THYROXINE: CPT

## 2021-07-07 PROCEDURE — 77030020782 HC GWN BAIR PAWS FLX 3M -B: Performed by: ORTHOPAEDIC SURGERY

## 2021-07-07 PROCEDURE — 76942 ECHO GUIDE FOR BIOPSY: CPT | Performed by: ORTHOPAEDIC SURGERY

## 2021-07-07 PROCEDURE — 77030027138 HC INCENT SPIROMETER -A: Performed by: ORTHOPAEDIC SURGERY

## 2021-07-07 PROCEDURE — 71045 X-RAY EXAM CHEST 1 VIEW: CPT

## 2021-07-07 PROCEDURE — 97161 PT EVAL LOW COMPLEX 20 MIN: CPT

## 2021-07-07 PROCEDURE — 77030020268 HC MISC GENERAL SUPPLY: Performed by: ORTHOPAEDIC SURGERY

## 2021-07-07 PROCEDURE — 65270000029 HC RM PRIVATE

## 2021-07-07 PROCEDURE — 77030008462 HC STPLR SKN PROX J&J -A: Performed by: ORTHOPAEDIC SURGERY

## 2021-07-07 PROCEDURE — C1776 JOINT DEVICE (IMPLANTABLE): HCPCS | Performed by: ORTHOPAEDIC SURGERY

## 2021-07-07 PROCEDURE — 77030003028 HC SUT VCRL J&J -A: Performed by: ORTHOPAEDIC SURGERY

## 2021-07-07 PROCEDURE — 77030040361 HC SLV COMPR DVT MDII -B: Performed by: ORTHOPAEDIC SURGERY

## 2021-07-07 PROCEDURE — 97110 THERAPEUTIC EXERCISES: CPT

## 2021-07-07 PROCEDURE — 74011000258 HC RX REV CODE- 258: Performed by: ORTHOPAEDIC SURGERY

## 2021-07-07 PROCEDURE — 3E0T3BZ INTRODUCTION OF ANESTHETIC AGENT INTO PERIPHERAL NERVES AND PLEXI, PERCUTANEOUS APPROACH: ICD-10-PCS | Performed by: ANESTHESIOLOGY

## 2021-07-07 PROCEDURE — 93970 EXTREMITY STUDY: CPT

## 2021-07-07 PROCEDURE — 87040 BLOOD CULTURE FOR BACTERIA: CPT

## 2021-07-07 PROCEDURE — 77030013708 HC HNDPC SUC IRR PULS STRY –B: Performed by: ORTHOPAEDIC SURGERY

## 2021-07-07 PROCEDURE — 83735 ASSAY OF MAGNESIUM: CPT

## 2021-07-07 PROCEDURE — 82803 BLOOD GASES ANY COMBINATION: CPT

## 2021-07-07 PROCEDURE — 80053 COMPREHEN METABOLIC PANEL: CPT

## 2021-07-07 PROCEDURE — 86901 BLOOD TYPING SEROLOGIC RH(D): CPT

## 2021-07-07 PROCEDURE — 83605 ASSAY OF LACTIC ACID: CPT

## 2021-07-07 PROCEDURE — 74011000258 HC RX REV CODE- 258: Performed by: HOSPITALIST

## 2021-07-07 PROCEDURE — 0SRC0J9 REPLACEMENT OF RIGHT KNEE JOINT WITH SYNTHETIC SUBSTITUTE, CEMENTED, OPEN APPROACH: ICD-10-PCS | Performed by: ORTHOPAEDIC SURGERY

## 2021-07-07 DEVICE — CEMENT BNE 20GM HALF DOSE PMMA VISC RADPQ FAST: Type: IMPLANTABLE DEVICE | Site: KNEE | Status: FUNCTIONAL

## 2021-07-07 DEVICE — CEMENT BNE 40GM FULL DOSE PMMA W/O ANTIBIO HI VISC N RADPQ: Type: IMPLANTABLE DEVICE | Site: KNEE | Status: FUNCTIONAL

## 2021-07-07 RX ORDER — DEXAMETHASONE SODIUM PHOSPHATE 10 MG/ML
INJECTION INTRAMUSCULAR; INTRAVENOUS
Status: COMPLETED | OUTPATIENT
Start: 2021-07-07 | End: 2021-07-07

## 2021-07-07 RX ORDER — MIDAZOLAM HYDROCHLORIDE 1 MG/ML
INJECTION, SOLUTION INTRAMUSCULAR; INTRAVENOUS
Status: COMPLETED | OUTPATIENT
Start: 2021-07-07 | End: 2021-07-07

## 2021-07-07 RX ORDER — KETOROLAC TROMETHAMINE 30 MG/ML
15 INJECTION, SOLUTION INTRAMUSCULAR; INTRAVENOUS EVERY 6 HOURS
Status: DISPENSED | OUTPATIENT
Start: 2021-07-07 | End: 2021-07-08

## 2021-07-07 RX ORDER — SODIUM CHLORIDE 0.9 % (FLUSH) 0.9 %
5-40 SYRINGE (ML) INJECTION EVERY 8 HOURS
Status: DISCONTINUED | OUTPATIENT
Start: 2021-07-07 | End: 2021-07-07 | Stop reason: HOSPADM

## 2021-07-07 RX ORDER — HYDROMORPHONE HYDROCHLORIDE 1 MG/ML
1 INJECTION, SOLUTION INTRAMUSCULAR; INTRAVENOUS; SUBCUTANEOUS
Status: DISCONTINUED | OUTPATIENT
Start: 2021-07-07 | End: 2021-07-12 | Stop reason: HOSPADM

## 2021-07-07 RX ORDER — DEXAMETHASONE SODIUM PHOSPHATE 4 MG/ML
INJECTION, SOLUTION INTRA-ARTICULAR; INTRALESIONAL; INTRAMUSCULAR; INTRAVENOUS; SOFT TISSUE AS NEEDED
Status: DISCONTINUED | OUTPATIENT
Start: 2021-07-07 | End: 2021-07-07 | Stop reason: HOSPADM

## 2021-07-07 RX ORDER — DEXTROSE 50 % IN WATER (D50W) INTRAVENOUS SYRINGE
25-50 AS NEEDED
Status: DISCONTINUED | OUTPATIENT
Start: 2021-07-07 | End: 2021-07-07 | Stop reason: HOSPADM

## 2021-07-07 RX ORDER — FENTANYL CITRATE 50 UG/ML
50 INJECTION, SOLUTION INTRAMUSCULAR; INTRAVENOUS AS NEEDED
Status: DISCONTINUED | OUTPATIENT
Start: 2021-07-07 | End: 2021-07-07 | Stop reason: HOSPADM

## 2021-07-07 RX ORDER — FENTANYL CITRATE 50 UG/ML
INJECTION, SOLUTION INTRAMUSCULAR; INTRAVENOUS AS NEEDED
Status: DISCONTINUED | OUTPATIENT
Start: 2021-07-07 | End: 2021-07-07 | Stop reason: HOSPADM

## 2021-07-07 RX ORDER — NALOXONE HYDROCHLORIDE 0.4 MG/ML
0.1 INJECTION, SOLUTION INTRAMUSCULAR; INTRAVENOUS; SUBCUTANEOUS AS NEEDED
Status: DISCONTINUED | OUTPATIENT
Start: 2021-07-07 | End: 2021-07-07 | Stop reason: HOSPADM

## 2021-07-07 RX ORDER — OXYCODONE HYDROCHLORIDE 5 MG/1
5 TABLET ORAL
Status: DISCONTINUED | OUTPATIENT
Start: 2021-07-07 | End: 2021-07-07

## 2021-07-07 RX ORDER — DEXMEDETOMIDINE HYDROCHLORIDE 100 UG/ML
INJECTION, SOLUTION INTRAVENOUS AS NEEDED
Status: DISCONTINUED | OUTPATIENT
Start: 2021-07-07 | End: 2021-07-07 | Stop reason: HOSPADM

## 2021-07-07 RX ORDER — MAGNESIUM SULFATE 100 %
4 CRYSTALS MISCELLANEOUS AS NEEDED
Status: DISCONTINUED | OUTPATIENT
Start: 2021-07-07 | End: 2021-07-07 | Stop reason: HOSPADM

## 2021-07-07 RX ORDER — ACETAMINOPHEN 500 MG
1000 TABLET ORAL ONCE
Status: COMPLETED | OUTPATIENT
Start: 2021-07-07 | End: 2021-07-07

## 2021-07-07 RX ORDER — NALOXONE HYDROCHLORIDE 0.4 MG/ML
INJECTION, SOLUTION INTRAMUSCULAR; INTRAVENOUS; SUBCUTANEOUS
Status: COMPLETED
Start: 2021-07-07 | End: 2021-07-07

## 2021-07-07 RX ORDER — ONDANSETRON 2 MG/ML
INJECTION INTRAMUSCULAR; INTRAVENOUS AS NEEDED
Status: DISCONTINUED | OUTPATIENT
Start: 2021-07-07 | End: 2021-07-07 | Stop reason: HOSPADM

## 2021-07-07 RX ORDER — VANCOMYCIN 2 GRAM/500 ML IN 0.9 % SODIUM CHLORIDE INTRAVENOUS
2000 ONCE
Status: COMPLETED | OUTPATIENT
Start: 2021-07-07 | End: 2021-07-08

## 2021-07-07 RX ORDER — PANTOPRAZOLE SODIUM 40 MG/1
40 TABLET, DELAYED RELEASE ORAL
Status: DISCONTINUED | OUTPATIENT
Start: 2021-07-08 | End: 2021-07-12 | Stop reason: HOSPADM

## 2021-07-07 RX ORDER — NALOXONE HYDROCHLORIDE 0.4 MG/ML
0.4 INJECTION, SOLUTION INTRAMUSCULAR; INTRAVENOUS; SUBCUTANEOUS
Status: COMPLETED | OUTPATIENT
Start: 2021-07-07 | End: 2021-07-07

## 2021-07-07 RX ORDER — ENOXAPARIN SODIUM 100 MG/ML
40 INJECTION SUBCUTANEOUS DAILY
Status: DISCONTINUED | OUTPATIENT
Start: 2021-07-08 | End: 2021-07-12 | Stop reason: HOSPADM

## 2021-07-07 RX ORDER — NALOXONE HYDROCHLORIDE 0.4 MG/ML
0.4 INJECTION, SOLUTION INTRAMUSCULAR; INTRAVENOUS; SUBCUTANEOUS ONCE
Status: ACTIVE | OUTPATIENT
Start: 2021-07-07 | End: 2021-07-08

## 2021-07-07 RX ORDER — KETAMINE HYDROCHLORIDE 10 MG/ML
INJECTION, SOLUTION INTRAMUSCULAR; INTRAVENOUS AS NEEDED
Status: DISCONTINUED | OUTPATIENT
Start: 2021-07-07 | End: 2021-07-07 | Stop reason: HOSPADM

## 2021-07-07 RX ORDER — ROPIVACAINE HYDROCHLORIDE 5 MG/ML
INJECTION, SOLUTION EPIDURAL; INFILTRATION; PERINEURAL
Status: COMPLETED | OUTPATIENT
Start: 2021-07-07 | End: 2021-07-07

## 2021-07-07 RX ORDER — HYDROMORPHONE HYDROCHLORIDE 1 MG/ML
INJECTION, SOLUTION INTRAMUSCULAR; INTRAVENOUS; SUBCUTANEOUS AS NEEDED
Status: DISCONTINUED | OUTPATIENT
Start: 2021-07-07 | End: 2021-07-07 | Stop reason: HOSPADM

## 2021-07-07 RX ORDER — DEXMEDETOMIDINE HYDROCHLORIDE 100 UG/ML
INJECTION, SOLUTION INTRAVENOUS
Status: COMPLETED | OUTPATIENT
Start: 2021-07-07 | End: 2021-07-07

## 2021-07-07 RX ORDER — PROPOFOL 10 MG/ML
INJECTION, EMULSION INTRAVENOUS AS NEEDED
Status: DISCONTINUED | OUTPATIENT
Start: 2021-07-07 | End: 2021-07-07 | Stop reason: HOSPADM

## 2021-07-07 RX ORDER — HYDROMORPHONE HYDROCHLORIDE 1 MG/ML
0.5 INJECTION, SOLUTION INTRAMUSCULAR; INTRAVENOUS; SUBCUTANEOUS
Status: DISCONTINUED | OUTPATIENT
Start: 2021-07-07 | End: 2021-07-07 | Stop reason: HOSPADM

## 2021-07-07 RX ORDER — CITALOPRAM 10 MG/1
10 TABLET ORAL DAILY
Status: DISCONTINUED | OUTPATIENT
Start: 2021-07-08 | End: 2021-07-12 | Stop reason: HOSPADM

## 2021-07-07 RX ORDER — FLUMAZENIL 0.1 MG/ML
0.2 INJECTION INTRAVENOUS
Status: DISCONTINUED | OUTPATIENT
Start: 2021-07-07 | End: 2021-07-07 | Stop reason: HOSPADM

## 2021-07-07 RX ORDER — ZOLPIDEM TARTRATE 5 MG/1
5 TABLET ORAL
Status: DISCONTINUED | OUTPATIENT
Start: 2021-07-07 | End: 2021-07-12 | Stop reason: HOSPADM

## 2021-07-07 RX ORDER — OXYCODONE HYDROCHLORIDE 5 MG/1
5 TABLET ORAL
Status: DISCONTINUED | OUTPATIENT
Start: 2021-07-07 | End: 2021-07-12 | Stop reason: HOSPADM

## 2021-07-07 RX ORDER — SODIUM CHLORIDE 0.9 % (FLUSH) 0.9 %
5-40 SYRINGE (ML) INJECTION AS NEEDED
Status: DISCONTINUED | OUTPATIENT
Start: 2021-07-07 | End: 2021-07-07 | Stop reason: HOSPADM

## 2021-07-07 RX ORDER — OXYCODONE HYDROCHLORIDE 5 MG/1
10 TABLET ORAL
Status: DISCONTINUED | OUTPATIENT
Start: 2021-07-07 | End: 2021-07-12 | Stop reason: HOSPADM

## 2021-07-07 RX ORDER — SODIUM CHLORIDE, SODIUM LACTATE, POTASSIUM CHLORIDE, CALCIUM CHLORIDE 600; 310; 30; 20 MG/100ML; MG/100ML; MG/100ML; MG/100ML
125 INJECTION, SOLUTION INTRAVENOUS CONTINUOUS
Status: DISCONTINUED | OUTPATIENT
Start: 2021-07-07 | End: 2021-07-07

## 2021-07-07 RX ORDER — SODIUM CHLORIDE 0.9 % (FLUSH) 0.9 %
5-40 SYRINGE (ML) INJECTION AS NEEDED
Status: DISCONTINUED | OUTPATIENT
Start: 2021-07-07 | End: 2021-07-12 | Stop reason: HOSPADM

## 2021-07-07 RX ORDER — LIDOCAINE HYDROCHLORIDE 20 MG/ML
INJECTION, SOLUTION EPIDURAL; INFILTRATION; INTRACAUDAL; PERINEURAL AS NEEDED
Status: DISCONTINUED | OUTPATIENT
Start: 2021-07-07 | End: 2021-07-07 | Stop reason: HOSPADM

## 2021-07-07 RX ORDER — ACETAMINOPHEN 325 MG/1
650 TABLET ORAL EVERY 6 HOURS
Status: DISCONTINUED | OUTPATIENT
Start: 2021-07-07 | End: 2021-07-12 | Stop reason: HOSPADM

## 2021-07-07 RX ORDER — PROCHLORPERAZINE EDISYLATE 5 MG/ML
5 INJECTION INTRAMUSCULAR; INTRAVENOUS ONCE
Status: DISCONTINUED | OUTPATIENT
Start: 2021-07-07 | End: 2021-07-07 | Stop reason: HOSPADM

## 2021-07-07 RX ORDER — CELECOXIB 100 MG/1
400 CAPSULE ORAL ONCE
Status: COMPLETED | OUTPATIENT
Start: 2021-07-07 | End: 2021-07-07

## 2021-07-07 RX ORDER — SODIUM CHLORIDE, SODIUM LACTATE, POTASSIUM CHLORIDE, CALCIUM CHLORIDE 600; 310; 30; 20 MG/100ML; MG/100ML; MG/100ML; MG/100ML
75 INJECTION, SOLUTION INTRAVENOUS CONTINUOUS
Status: DISCONTINUED | OUTPATIENT
Start: 2021-07-07 | End: 2021-07-08

## 2021-07-07 RX ORDER — SODIUM CHLORIDE 0.9 % (FLUSH) 0.9 %
5-40 SYRINGE (ML) INJECTION EVERY 8 HOURS
Status: DISCONTINUED | OUTPATIENT
Start: 2021-07-07 | End: 2021-07-12 | Stop reason: HOSPADM

## 2021-07-07 RX ORDER — FAMOTIDINE 20 MG/1
40 TABLET, FILM COATED ORAL DAILY
Status: DISCONTINUED | OUTPATIENT
Start: 2021-07-08 | End: 2021-07-12 | Stop reason: HOSPADM

## 2021-07-07 RX ORDER — GLYCOPYRROLATE 0.2 MG/ML
INJECTION INTRAMUSCULAR; INTRAVENOUS AS NEEDED
Status: DISCONTINUED | OUTPATIENT
Start: 2021-07-07 | End: 2021-07-07 | Stop reason: HOSPADM

## 2021-07-07 RX ADMIN — NALXONE HYDROCHLORIDE 0.4 MG: 0.4 INJECTION INTRAMUSCULAR; INTRAVENOUS; SUBCUTANEOUS at 17:22

## 2021-07-07 RX ADMIN — KETAMINE HYDROCHLORIDE 20 MG: 10 INJECTION, SOLUTION INTRAMUSCULAR; INTRAVENOUS at 10:57

## 2021-07-07 RX ADMIN — NALOXONE HYDROCHLORIDE 0.4 MG: 0.4 INJECTION, SOLUTION INTRAMUSCULAR; INTRAVENOUS; SUBCUTANEOUS at 17:18

## 2021-07-07 RX ADMIN — NALXONE HYDROCHLORIDE: 0.4 INJECTION INTRAMUSCULAR; INTRAVENOUS; SUBCUTANEOUS at 17:28

## 2021-07-07 RX ADMIN — NALXONE HYDROCHLORIDE 0.4 MG: 0.4 INJECTION INTRAMUSCULAR; INTRAVENOUS; SUBCUTANEOUS at 17:18

## 2021-07-07 RX ADMIN — FENTANYL CITRATE 50 MCG: 50 INJECTION INTRAMUSCULAR; INTRAVENOUS at 13:07

## 2021-07-07 RX ADMIN — SODIUM CHLORIDE, SODIUM LACTATE, POTASSIUM CHLORIDE, AND CALCIUM CHLORIDE 125 ML/HR: 600; 310; 30; 20 INJECTION, SOLUTION INTRAVENOUS at 08:51

## 2021-07-07 RX ADMIN — ACETAMINOPHEN 1000 MG: 500 TABLET ORAL at 08:51

## 2021-07-07 RX ADMIN — ROPIVACAINE HYDROCHLORIDE 12 ML: 5 INJECTION, SOLUTION EPIDURAL; INFILTRATION; PERINEURAL at 10:29

## 2021-07-07 RX ADMIN — ACETAMINOPHEN 650 MG: 325 TABLET, FILM COATED ORAL at 18:50

## 2021-07-07 RX ADMIN — LIDOCAINE HYDROCHLORIDE 60 MG: 20 INJECTION, SOLUTION INTRAVENOUS at 10:43

## 2021-07-07 RX ADMIN — VANCOMYCIN HYDROCHLORIDE 2000 MG: 10 INJECTION, POWDER, LYOPHILIZED, FOR SOLUTION INTRAVENOUS at 22:15

## 2021-07-07 RX ADMIN — DEXMEDETOMIDINE HYDROCHLORIDE 5 MCG: 100 INJECTION, SOLUTION INTRAVENOUS at 12:23

## 2021-07-07 RX ADMIN — FENTANYL CITRATE 50 MCG: 50 INJECTION, SOLUTION INTRAMUSCULAR; INTRAVENOUS at 10:43

## 2021-07-07 RX ADMIN — SODIUM CHLORIDE, SODIUM LACTATE, POTASSIUM CHLORIDE, AND CALCIUM CHLORIDE: 600; 310; 30; 20 INJECTION, SOLUTION INTRAVENOUS at 11:45

## 2021-07-07 RX ADMIN — OXYCODONE 10 MG: 5 TABLET ORAL at 16:30

## 2021-07-07 RX ADMIN — CELECOXIB 400 MG: 100 CAPSULE ORAL at 08:51

## 2021-07-07 RX ADMIN — PROPOFOL 200 MG: 10 INJECTION, EMULSION INTRAVENOUS at 10:43

## 2021-07-07 RX ADMIN — DEXMEDETOMIDINE HYDROCHLORIDE 5 MCG: 100 INJECTION, SOLUTION INTRAVENOUS at 11:33

## 2021-07-07 RX ADMIN — KETOROLAC TROMETHAMINE 15 MG: 30 INJECTION, SOLUTION INTRAMUSCULAR; INTRAVENOUS at 20:00

## 2021-07-07 RX ADMIN — ONDANSETRON HYDROCHLORIDE 4 MG: 2 INJECTION INTRAMUSCULAR; INTRAVENOUS at 10:48

## 2021-07-07 RX ADMIN — MEPIVACAINE HYDROCHLORIDE 10 ML: 20 INJECTION, SOLUTION EPIDURAL; INFILTRATION at 10:25

## 2021-07-07 RX ADMIN — DEXAMETHASONE SODIUM PHOSPHATE 4 MG: 10 INJECTION, SOLUTION INTRAMUSCULAR; INTRAVENOUS at 10:25

## 2021-07-07 RX ADMIN — HYDROMORPHONE HYDROCHLORIDE 0.5 MG: 1 INJECTION, SOLUTION INTRAMUSCULAR; INTRAVENOUS; SUBCUTANEOUS at 12:45

## 2021-07-07 RX ADMIN — DEXAMETHASONE SODIUM PHOSPHATE 4 MG: 4 INJECTION, SOLUTION INTRAMUSCULAR; INTRAVENOUS at 10:48

## 2021-07-07 RX ADMIN — FENTANYL CITRATE 50 MCG: 50 INJECTION, SOLUTION INTRAMUSCULAR; INTRAVENOUS at 10:56

## 2021-07-07 RX ADMIN — KETAMINE HYDROCHLORIDE 30 MG: 10 INJECTION, SOLUTION INTRAMUSCULAR; INTRAVENOUS at 10:52

## 2021-07-07 RX ADMIN — DEXMEDETOMIDINE HYDROCHLORIDE 10 MCG: 100 INJECTION, SOLUTION INTRAVENOUS at 12:03

## 2021-07-07 RX ADMIN — DEXMEDETOMIDINE HYDROCHLORIDE 5 MCG: 100 INJECTION, SOLUTION INTRAVENOUS at 11:26

## 2021-07-07 RX ADMIN — PIPERACILLIN AND TAZOBACTAM 3.38 G: 3; .375 INJECTION, POWDER, LYOPHILIZED, FOR SOLUTION INTRAVENOUS at 18:50

## 2021-07-07 RX ADMIN — GLYCOPYRROLATE 0.2 MG: 0.2 INJECTION INTRAMUSCULAR; INTRAVENOUS at 10:35

## 2021-07-07 RX ADMIN — HYDROMORPHONE HYDROCHLORIDE 0.5 MG: 1 INJECTION, SOLUTION INTRAMUSCULAR; INTRAVENOUS; SUBCUTANEOUS at 12:25

## 2021-07-07 RX ADMIN — SODIUM CHLORIDE, POTASSIUM CHLORIDE, SODIUM LACTATE AND CALCIUM CHLORIDE 75 ML/HR: 600; 310; 30; 20 INJECTION, SOLUTION INTRAVENOUS at 16:31

## 2021-07-07 RX ADMIN — Medication 10 ML: at 22:15

## 2021-07-07 RX ADMIN — SODIUM CHLORIDE 500 ML: 900 INJECTION, SOLUTION INTRAVENOUS at 17:35

## 2021-07-07 RX ADMIN — FENTANYL CITRATE 50 MCG: 50 INJECTION INTRAMUSCULAR; INTRAVENOUS at 13:29

## 2021-07-07 RX ADMIN — DEXMEDETOMIDINE HYDROCHLORIDE 25 MCG: 100 INJECTION, SOLUTION INTRAVENOUS at 10:25

## 2021-07-07 RX ADMIN — MIDAZOLAM 4 MG: 1 INJECTION INTRAMUSCULAR; INTRAVENOUS at 10:25

## 2021-07-07 RX ADMIN — ROPIVACAINE HYDROCHLORIDE 20 ML: 5 INJECTION, SOLUTION EPIDURAL; INFILTRATION; PERINEURAL at 10:25

## 2021-07-07 RX ADMIN — CEFAZOLIN 3 G: 1 INJECTION, POWDER, FOR SOLUTION INTRAVENOUS at 10:47

## 2021-07-07 NOTE — PROGRESS NOTES
Problem: Self Care Deficits Care Plan (Adult)  Goal: *Acute Goals and Plan of Care (Insert Text)  Description: Initial Occupational Therapy Goals (7/7/2021) Within 7 day(s):    1. Patient will perform grooming standing sinkside with supervision for increased independence with ADLs. 2. Patient will perform LB dressing with supervision & A/E PRN for increased independence with ADLs. 3. Patient will perform toilet transfer with supervision for increased independence with ADLs. 4. Patient will perform all aspects of toileting with supervision for increased independence with ADLs. 5. Patient will independently apply energy conservation techniques with 1 verbal cue(s)for increased independence with ADLs. 6. Patient will perform bathroom mobility with supervision for increased independence/safety with ADLs. Outcome: Progressing Towards Goal   OCCUPATIONAL THERAPY EVALUATION    Patient: Alberto Sanders (60 y.o. female)  Date: 7/7/2021  Primary Diagnosis: DJD (degenerative joint disease) of knee [M17.10]  Procedure(s) (LRB):  RIGHT TOTAL KNEE ARTHROPLASTY WITH CONFORMIS  **SPEC POP** (Right) Day of Surgery   Precautions: Fall, WBAT  PLOF: pt mod I for ADLs/functional mobility, daughter assist with lower body dressing as needed    ASSESSMENT AND RECOMMENDATIONS:  Based on the objective data described below, the patient presents with RLE decreased ROM and strength affecting LE ADLs. Pt found supine in bed, /67, pt reporting pain 5/10, agreeable to therapy. Pt was able to sit up to EOB with CGA/additional time. Educated pt on proper body mechanics s/p TKR including adaptive strategies for lower body ADLs and clothing modifications. Pt requesting to use bathroom; pt CGA for STS/bathroom mobility/toilet transfer with vc for safe use of RW. Pt voided seated on toilet and performed bladder hygiene with CGA. Pt then reported increased light headedness. Pt became unresponsive and had LOC, RN notified and RRT called. Pulse was present and pt breathing, unable to assess BP due to positioning. Pt transferred to /c with total Ax3, and assisted into bed with total Ax4 and pt still unresponsive. Pt left with nursing staff and rapid response team. Patient will benefit from skilled Occupational Therapy intervention to maximize safety/independence with ADLs at d/c.    Education: Reviewed home safety, body mechanics, importance of moving every hour to prevent joint stiffness, role of ice for edema/pain control, Rolling Walker management/safety, and adaptive dressing techniques with patient verbalizing  understanding at this time     Patient will benefit from skilled intervention to address the above impairments. Patient's rehabilitation potential is considered to be Good  Factors which may influence rehabilitation potential include:   []             None noted  []             Mental ability/status  [x]             Medical condition  []             Home/family situation and support systems  []             Safety awareness  []             Pain tolerance/management  []             Other:        PLAN :  Recommendations and Planned Interventions:   [x]               Self Care Training                  [x]      Therapeutic Activities  [x]               Functional Mobility Training   []      Cognitive Retraining  []               Therapeutic Exercises           []      Endurance Activities  []               Balance Training                    []      Neuromuscular Re-Education  []               Visual/Perceptual Training     [x]      Home Safety Training  [x]               Patient Education                   [x]      Family Training/Education  []               Other (comment):    Frequency/Duration: Patient will be followed by Occupational Therapy 1-2 times per day/4-7 days per week to address goals.   Discharge Recommendations: Home health   Further Equipment Recommendations for Discharge: N/A     SUBJECTIVE:   Patient stated i'd like to use the bathroom.     OBJECTIVE DATA SUMMARY:     Past Medical History:   Diagnosis Date    Anxiety     Arthritis     Athyroidism (acquired)     goiter issues    GERD (gastroesophageal reflux disease)     Hypothyroid     Intestinal malabsorption     Morbid obesity ([facility] [address]     Morbid obesity with body mass index (BMI) of 40.0 to 49.9 ([facility])     Smoking history     quit 2000    Status post gastric bypass for obesity 2005    laparoscopic / [facility]     Past Surgical History:   Procedure Laterality Date    HX HEENT  2000    thyroidectomy    HX KNEE ARTHROSCOPY Right     HX LAP GASTRIC BYPASS  2005    [facility]    HX THYROIDECTOMY      HX TUBAL LIGATION       Barriers to Learning/Limitations: yes;  physical and altered mental status (i.e.Sedation, Confusion)  Compensate with: visual, verbal, tactile, kinesthetic cues/model    Home Situation/Prior Level of Function:   Home Situation  Home Environment: Private residence  # Steps to Enter: 16  Rails to Enter: Yes  Hand Rails : Bilateral  One/Two Story Residence: One story  Living Alone: No  Support Systems: Family member(s), Child(gina)  Patient Expects to be Discharged to[de-identified] House  Current DME Used/Available at Home: Cane, straight, Walker, rolling, Grab bars  Tub or Shower Type: Tub/Shower combination  []  Right hand dominant   []  Left hand dominant    Cognitive/Behavioral Status:  Neurologic State: Alert  Orientation Level: Oriented X4  Cognition: Follows commands  Safety/Judgement: Awareness of environment    Skin: R knee incision w/ Mepilex   Edema: compression hose in place & applied ice     Coordination: BUE  Coordination: Within functional limits  Fine Motor Skills-Upper: Left Intact; Right Intact    Gross Motor Skills-Upper: Left Intact; Right Intact    Balance:  Sitting: Intact  Standing: Intact; With support    Strength: BUE  Strength: Generally decreased, functional    Tone & Sensation:BUE  Tone: Normal  Sensation: Impaired (R knee)    Range of Motion: BUE  AROM: Generally decreased, functional    Functional Mobility and Transfers for ADLs:  Bed Mobility:  Supine to Sit: Contact guard assistance; Additional time  Scooting: Contact guard assistance    Transfers:  Sit to Stand: Contact guard assistance (vc)   Toilet Transfer : Contact guard assistance (vc)   Bathroom Mobility: Contact guard assistance (vc)    ADL Assessment:  Feeding: Independent  Oral Facial Hygiene/Grooming: Contact guard assistance  Bathing: Moderate assistance  Upper Body Dressing: Stand-by assistance  Lower Body Dressing: Maximum assistance  Toileting: Contact guard assistance    ADL Intervention:  Toileting  Toileting Assistance: Contact guard assistance  Bladder Hygiene: Contact guard assistance  Clothing Management: Contact guard assistance    Cognitive Retraining  Safety/Judgement: Awareness of environment    Pain:  Pain level pre-treatment: 5/10  Pain Intervention(s): Medication administer by Nursing (see MAR); Rest, Ice, Repositioning   Response to intervention: Nurse notified, see doc flow     Activity Tolerance:   Fair-. Patient able to stand ~4 minute(s). Patient able to complete ADLs with intermittent rest breaks. Patient limited by pain, strength, ROM. Patient unsteady. Please refer to the flowsheet for vital signs taken during this treatment. After treatment:   []  Patient left in no apparent distress sitting up in chair  []  Patient sitting on EOB  [x]  Patient left in bed  [x]  Call bell left within reach  [x]  Nursing notified  [x]  Caregiver present  [x]  Ice applied  []  SCD's on while back in bed  [] Bed alarm activated    COMMUNICATION/EDUCATION:   Communication/Collaboration:  [x]       Role of Occupational Therapy in the acute care setting. [x]      Home safety education was provided and the patient/caregiver indicated understanding. [x]      Patient/family have participated as able in goal setting and plan of care.   [x]      Patient/family agree to work toward stated goals and plan of care.  []      Patient understands intent and goals of therapy, but is neutral about his/her participation. []      Patient is unable to participate in plan of care at this time. Thank you for this referral.  Ronny Amador OTR/L  Time Calculation: 50 mins    Eval Complexity: History: MEDIUM Complexity : Expanded review of history including physical, cognitive and psychosocial  history ; Examination: HIGH Complexity : 5 or more performance deficits relating to physical, cognitive , or psychosocial skils that result in activity limitations and / or participation restrictions; Decision Making:HIGH Complexity : Patient presents with comorbidities that affect occupational performance.  Signifigant modification of tasks or assistance (eg, physical or verbal) with assessment (s) is necessary to enable patient to complete evaluation

## 2021-07-07 NOTE — PROGRESS NOTES
1420 - Received patient from PACU in satisfactory condition. VSS. Assumed care of patient. Dual skin assessment completed with Lo Stephen RN. No pressure areas noted. Fall risk band in place. Patient is alert and oriented x 4. Patient is calm and cooperative. Patients PERRLA. Denies numbness or tingling to BLE. Pedal pulses palpable and equal bilaterally. Capillary Refill < 3 seconds. Lung sounds clear bilaterally. Respirations even and unlabored. No use of accessory muscles. Educated on incentive spirometer and demonstrated proper use. Abdomen is soft and non-tender. Bowel sounds active to all quadrants. Patient has not voided post-operatively. No bladder distention evident. No complaints of bladder discomfort. Skin is warm, dry and skin color is appropriate to race. Ace wrap dressing to RLE noted CDI. No other skin integrity issues present. Sequential compression device applied to LLE. Plexi foot pump to right foot. IV intact to right hand and infusing without difficulty. Denies any pain, reports discomfort to RLE. Patient oriented to call bell use as well as bed use. Patient oriented to phone and how to order meals. Call bell within reach. Bed in low position. Three side rails up. 1630 - PRN oxycodone administered for 5/10 pain to RLE and in anticipation of pain during physical therapy. 1712 - Upon answering call light, patient noted sitting on toilet with two physical therapists present. Patient noted with eyes closed, head down, and diaphoretic. Patient initially responding verbally to verbal stimuli, then moaning as a response to verbal stimuli, then patient became unresponsive to verbal and physical stimuli. RRT called. Patient noted with very shallow breathing. Unable to palpate radial pulse. 1717 - . Unable to obtain BP. O2 sats 100%, HR 63    1718 - Narcan 0.4 mg administered. [address] on 2L via NC. Narcan 0.4 mg administered. 1725 - 500 ml NS bolus started.      1728 - Narcan 0.4 mg administered. 1730 - Patient assisted from toilet to wheelchair to bed with assist x4 staff. Placed on 2L via 0 [facility].    1741 - Patient noted with eyes open and verbally responding. Noted crying and asking for daughter. Oriented x4. BP noted 109/75. O2 sats 100%. Temp 98.1 F. Attempts being made to obtain blood for labs and ABG's. Family present and updated on patient condition by Dr. Cate Real.    1747 - BP noted 123/67.    1821 - Dr. Pao Alarcon on call and notified of emergency situation and transfer to ICU. Stated he would notify Dr. Aicha Valentine. 1850 - Patient remains alert and oriented x4. On 2L via NC. Respirations even and unlabored. Cardiac monitor in place. Patient resting in bed with call light in reach. 1910 - Patient transported off floor to ICU by this nurse and Suni Peraza (medic).

## 2021-07-07 NOTE — ROUTINE PROCESS
RRT called to room 206 @ 17:12 for patient with altered mental status post procedure. Pt slowly responded to multiple doses of Narcan as ordered by Dr. GOODWIN, three RN's at patient's side, ECG ordered and completed, given to Dr. Sherry Ornelas. ABG'S ordered & aquirred by Respiratory Tech. New vascular access acquired @ Dr's request needed for CTA and additional access. Patient transferred to ICU bed 6 while on portable monitor/defibrillator, 2 L/min nasal 02, & accompanied by this Paramedic and one RN. Blood specimens delivered to Lab durring transfer. End of event.   J CARLOS SHARMA

## 2021-07-07 NOTE — PERIOP NOTES
18 [facility] made aware that SBAR is ready for review. Patient assigned room #206.  Fang Ro will be the nurse

## 2021-07-07 NOTE — H&P
History and Physical        Patient: Elsa Mascorro               Sex: female          DOA: 2021         YOB: 1975      Age:  50 y.o.        LOS:  LOS: 0 days        HPI:     Elsa Mascorro is a 50 y.o. female who has right knee DJD for TKR has failed non-op therapy    Past Medical History:   Diagnosis Date    Anxiety     Arthritis     Athyroidism (acquired)     goiter issues    GERD (gastroesophageal reflux disease)     Hypothyroid     Intestinal malabsorption     Morbid obesity ([facility] [address]     Morbid obesity with body mass index (BMI) of 40.0 to 49.9 ([facility] [address]     Smoking history     quit     Status post gastric bypass for obesity     laparoscopic / [address]       Past Surgical History:   Procedure Laterality Date    HX HEENT      thyroidectomy    HX KNEE ARTHROSCOPY Right     HX LAP GASTRIC BYPASS  2005    [facility]    HX THYROIDECTOMY      HX TUBAL LIGATION         Family History   Problem Relation Age of Onset    Heart Disease Father        Social History     Socioeconomic History    Marital status:      Spouse name: Not on file    Number of children: Not on file    Years of education: Not on file    Highest education level: Not on file   Tobacco Use    Smoking status: Former Smoker     Quit date:      Years since quittin.5    Smokeless tobacco: Never Used   Vaping Use    Vaping Use: Never used   Substance and Sexual Activity    Alcohol use: Yes     Comment: 1-2 x year    Drug use: Never     Social Determinants of Health     Financial Resource Strain:     Difficulty of Paying Living Expenses:    Food Insecurity:     Worried About Running Out of Food in the Last Year:     [address] in the Last Year:    Transportation Needs:     Lack of Transportation (Medical):      Lack of Transportation (Non-Medical):    Physical Activity:     Days of Exercise per Week:     Minutes of Exercise per Session:    Stress:     Feeling of Stress :    Social Connections:     Frequency of Communication with Friends and Family:     Frequency of Social Gatherings with Friends and Family:     Attends Taoism Services:     Active Member of Clubs or Organizations:     Attends Club or Organization Meetings:     Marital Status:        Prior to Admission medications    Medication Sig Start Date End Date Taking? Authorizing Provider   acetaminophen (Tylenol Arthritis Pain) 650 mg TbER Take 650 mg by mouth every eight (8) hours. Yes Provider, Historical   levothyroxine (SYNTHROID) 125 mcg tablet Take 125 mcg by mouth Daily (before breakfast). Yes Provider, Historical   citalopram (CELEXA) 10 mg tablet Take 10 mg by mouth daily. Yes Provider, Historical   famotidine (PEPCID) 40 mg tablet Take 40 mg by mouth daily. Yes Provider, Historical   cyanocobalamin (VITAMIN B-12) 1,000 mcg sublingual tablet 1,000 mcg by SubLINGual route daily. 1/8/20  Yes Provider, Historical   ascorbic acid, vitamin C, (Vitamin C) 500 mg tablet Take  by mouth. Yes Provider, Historical   omeprazole (PRILOSEC) 40 mg capsule Take 40 mg by mouth nightly. Provider, Historical   ergocalciferol (ERGOCALCIFEROL) 1,250 mcg (50,000 unit) capsule Vitamin D2 1,250 mcg (50,000 unit) capsule    Provider, Historical       Allergies   Allergen Reactions    Codeine Other (comments)     Burning stomach and chest        Tylenol-Codeine #3 [Acetaminophen-Codeine] Other (comments)     Can take tylenol       Review of Systems  Pertinent items are noted in the History of Present Illness. Physical Exam:      Visit Vitals  /85 (BP 1 Location: Left upper arm, BP Patient Position: At rest;Sitting)   Pulse 70   Temp 97.6 °F (36.4 °C)   Resp 18   Ht 5' 5\" (1.651 m)   Wt 129.9 kg (286 lb 6.4 oz)   SpO2 100%   BMI 47.66 kg/m²       Physical Exam:  Physical Exam:   General:  Alert, cooperative, no distress, appears stated age. Eyes:  Conjunctivae/corneas clear. PERRL, EOMs intact.  Fundi benign   Ears: Normal TMs and external ear canals both ears. Nose: Nares normal. Septum midline. Mucosa normal. No drainage or sinus tenderness. Mouth/Throat: Lips, mucosa, and tongue normal. Teeth and gums normal.   Neck: Supple, symmetrical, trachea midline, no adenopathy, thyroid: no enlargement/tenderness/nodules, no carotid bruit and no JVD. Back:   Symmetric, no curvature. ROM normal. No CVA tenderness. Lungs:   Clear to auscultation bilaterally. Heart:  Regular rate and rhythm, S1, S2 normal, no murmur, click, rub or gallop. Abdomen:   Soft, non-tender. Bowel sounds normal. No masses,  No organomegaly. Extremities: Extremities normal, atraumatic, no cyanosis or edema. Right knee pain with ROM   Pulses: 2+ and symmetric all extremities. Skin: Skin color, texture, turgor normal. No rashes or lesions   Lymph nodes: Cervical, supraclavicular, and axillary nodes normal.   Neurologic: CNII-XII intact. Normal strength, sensation and reflexes throughout. Labs Reviewed: All lab results for the last 24 hours reviewed.     Assessment/Plan     Active Problems:    DJD (degenerative joint disease) of knee (7/7/2021)        Right TKR

## 2021-07-07 NOTE — OP NOTES
[facility] [place]  OPERATIVE REPORT    Name:  Antonietta Alvarenga  MR#:   [id number]  :  1975  ACCOUNT #:  [de-identified]  DATE OF SERVICE:  2021    PREOPERATIVE DIAGNOSIS:  Right knee degenerative joint disease. POSTOPERATIVE DIAGNOSIS:  Right knee degenerative joint disease. PROCEDURE PERFORMED:  Right total knee replacement. SURGEON:  Gaston Tucker MD    ASSISTANT:  No assistants. ANESTHESIA:  General with supplemental adductor canal block. ANESTHESIOLOGIST:  Christen Crowe DO    COMPLICATIONS:  No complications. SPECIMENS REMOVED:  No specimen. IMPLANTS:  Conformis right knee iTotal.    ESTIMATED BLOOD LOSS:  Minimal blood loss. INDICATIONS:  A 69-year-old female, goes to surgery for knee replacement. PROCEDURE:  The patient was brought to the operating room after receiving the block and antibiotics in the OR. General anesthesia was administered. Tourniquet was placed on the right thigh. Right lower extremity was prepped and draped sterilely. After exsanguination, tourniquet was inflated to 350 mmHg. We had to apply a second tourniquet because of venous bleeding. Midline incision was made. Flaps were developed. Medial arthrotomy was performed. The patella was everted, measured, cut, drilled to accept a 3-hole oval patella and a protective cap was placed. At this point, with the knee flexed to 90 degrees, utilizing the patient's specific femoral guide, the distal femoral cut was made. A patient specific guide was used to cut the proximal tibia and the extension gap was verified. At this point, the patient specific guide was pinned onto the femur and the anterior, posterior and chamfering cut was made. Two additional blocks were used to create the chamfering cuts. At this point, the remnants of the menisci were removed preserving the PCL and popliteus. Posterior capsule was Bovied and additional local anesthesia was injected throughout the knee.   A trial reduction was made with the femoral trial and a spacer block. The knee could be fully extended with normal stability. At this point, the tibia was prepared with a tibial patient specific guide. At this point, the wound was thoroughly irrigated and cement was prepared. Tibial component was cemented. The medial and lateral bearings were snapped into position and then the femoral component was cemented, followed by the patella component. Excess cement was removed and allowed to cure. At this point, after irrigation, the arthrotomy incision was closed with Vicryl, subcu with Vicryl, skin was closed with Dermabond and staples. An Aquacel dressing, followed by a thigh-high CECILY stocking was applied. Tourniquet was released with normal filling distally and patient went to recovery in stable condition. The patient remained neurovascularly intact and went to Recovery in stable condition.       Myesha Zelaya MD      [id number]  D:  07/07/2021 12:35  T:  07/07/2021 13:11  JOB #:  [id number]

## 2021-07-07 NOTE — ANESTHESIA PREPROCEDURE EVALUATION
Relevant Problems   No relevant active problems       Anesthetic History   No history of anesthetic complications            Review of Systems / Medical History  Patient summary reviewed, nursing notes reviewed and pertinent labs reviewed    Pulmonary  Within defined limits                 Neuro/Psych   Within defined limits           Cardiovascular  Within defined limits                     GI/Hepatic/Renal     GERD: well controlled           Endo/Other      Hypothyroidism  Morbid obesity and arthritis     Other Findings              Physical Exam    Airway  Mallampati: III  TM Distance: 4 - 6 cm  Neck ROM: normal range of motion   Mouth opening: Normal     Cardiovascular               Dental  No notable dental hx       Pulmonary                 Abdominal  GI exam deferred       Other Findings            Anesthetic Plan    ASA: 3  Anesthesia type: general and regional - saphenous block and IPACK block      Post-op pain plan if not by surgeon: peripheral nerve block single    Induction: Intravenous  Anesthetic plan and risks discussed with: Patient

## 2021-07-07 NOTE — CONSULTS
Medicine Consult    Patient:  Pastora Almonte 50 y.o. female  Asked to evaluate patient by Dr. Jesus Blackman   Primary Care Provider:  Iris Dale MD  Date of Admission:  7/7/2021  Reason for Consult: RRT /unresponsiveness         Assessment/Plan     Hospital Problems  Date Reviewed: 3/10/2021        Codes Class Noted POA    DJD (degenerative joint disease) of knee ICD-10-CM: M17.10  ICD-9-CM: 715.36  7/7/2021 Yes        Unresponsiveness ICD-10-CM: R41.89  ICD-9-CM: 780.09  7/7/2021 Unknown        GERD (gastroesophageal reflux disease) ICD-10-CM: K21.9  ICD-9-CM: 530.81  Unknown Yes        Morbid obesity ([facility] [address] ICD-10-CM: E66.01  ICD-9-CM: 278.01  Unknown Yes              PLAN:  Unresponsiveness   Never lost pulse, start ekg SR, Abg pH 7.36, pO2 104 with 2 L O2. pCo2 41 .4   Ct head, cta chest, ct abdomen/pelvic/Cmp. Mg. Tsh, free t4, bcx, ucx, cardiac enzyme   Difficulty in obtain blood sample and abg. Glucose 200, ns bolus given, last sbp 120s  Hypothermia during RRT-vanc and zosyn empiric tx    Multiple narcan administrated   Family -sister and daughter were at the bedside -updated  Hold ambien, celexa , iv dilaudid , oxy 10, keep oxy 5 Q6hr prn     hypothyroidism   Check tsh, free t4  On synthyroid     Morbid obesity     gerd : ppi       ddd : post surgery   Supportive care    50 minutes of critical care time spent in the direct evaluation and treatment of this high risk patient. The reason for providing this level of medical care for this critically ill patient was due a critical illness that impaired one or more vital organ systems such that there was a high probability of imminent or life threatening deterioration in the patients condition. This care involved high complexity decision making to assess, manipulate, and support vital system functions, to treat this degreee vital organ system failure and to prevent further life threatening deterioration of the patients condition.     Thank you for allowing us to participate in  Pt's care and will follow   HPI:   Natali Golden is a 50 y.o. female with past medical history significant for gerd, morbid obesity was admitted to orthopedics service for Knee replacement. Hospital medicine is consulted for RRT. Pt was found unresponsiveness when she was sitting on toilet. Narcan was given twice, not responded to narcan. She was removed from bathroom with team of 4 people helps. More narcan was administrated. T was low one time, ns bolus started during rrt . She has been breathing by per own and pulse positive during RRT. abg was attempted, stat imagines and lab ordered. ekg done. She had been unresponsivenss over 20 mins, she woke up per her own. Per PT reported she was alert and oriented, walked in the [address] way before it happened. Past Medical History:   Diagnosis Date    Anxiety     Arthritis     Athyroidism (acquired)     goiter issues    GERD (gastroesophageal reflux disease)     Hypothyroid     Intestinal malabsorption     Morbid obesity (HCC)     Morbid obesity with body mass index (BMI) of 40.0 to 49.9 (HCC)     Smoking history     quit     Status post gastric bypass for obesity     laparoscopic / Sheela Flores     Past Surgical History:   Procedure Laterality Date    HX HEENT      thyroidectomy    HX KNEE ARTHROSCOPY Right     HX LAP GASTRIC BYPASS  2005    [facility]    HX THYROIDECTOMY      HX TUBAL LIGATION        Social History     Tobacco Use    Smoking status: Former Smoker     Quit date:      Years since quittin.5    Smokeless tobacco: Never Used   Vaping Use    Vaping Use: Never used   Substance Use Topics    Alcohol use: Yes     Comment: 1-2 x year    Drug use: Never     Family History   Problem Relation Age of Onset    Heart Disease Father      No current facility-administered medications on file prior to encounter.      Current Outpatient Medications on File Prior to Encounter   Medication Sig Dispense Refill    levothyroxine (SYNTHROID) 125 mcg tablet Take 125 mcg by mouth Daily (before breakfast).  citalopram (CELEXA) 10 mg tablet Take 10 mg by mouth daily.  famotidine (PEPCID) 40 mg tablet Take 40 mg by mouth daily.  cyanocobalamin (VITAMIN B-12) 1,000 mcg sublingual tablet 1,000 mcg by SubLINGual route daily.  ascorbic acid, vitamin C, (Vitamin C) 500 mg tablet Take  by mouth.  omeprazole (PRILOSEC) 40 mg capsule Take 40 mg by mouth nightly.  ergocalciferol (ERGOCALCIFEROL) 1,250 mcg (50,000 unit) capsule Vitamin D2 1,250 mcg (50,000 unit) capsule        Allergies   Allergen Reactions    Codeine Other (comments)     Burning stomach and chest        Tylenol-Codeine #3 [Acetaminophen-Codeine] Other (comments)     Can take tylenol           Review of Systems  Unresponsives-after she got consciousness, she denies any chest pain, no sob       Physical Exam:      Visit Vitals  /67   Pulse 62   Temp 98.2 °F (36.8 °C)   Resp 12   Ht 5' 5\" (1.651 m)   Wt 129.9 kg (286 lb 6.4 oz)   SpO2 92%   BMI 47.66 kg/m²     Body mass index is 47.66 kg/m². Physical Exam:  GEN: well nourished,   HEENT: atraumatic, nose normal,oropharynx clear, MMM  NECK: supple, trachea midline, no supraclavicular or submandibular adenopathy noted  EYES: conjuctiva normal, lids with out lesions, PERRL  HEART: RRR with out m/r/g, pmi nondisplaced, pulses 2+ distally  LUNGS: equal chest wall expansion, cta bl with out wheezes/rales or rhonchi  AB: soft, +BS, nt/nd no organomegaly  NEURO: alert, awake and oriented x3, rt leg wrapped with ace bandage   SKIN: dry, intact, cold hands during RRT         Laboratory Studies:      Labs: Results:       Chemistry No results for input(s): GLU, NA, K, CL, CO2, BUN, CREA, CA, AGAP, BUCR, TBIL, AP, TP, ALB, GLOB, AGRAT in the last 72 hours.     No lab exists for component: GPT   CBC w/Diff No results for input(s): WBC, RBC, HGB, HCT, PLT, GRANS, LYMPH, EOS, HGBEXT, HCTEXT, PLTEXT in the last 72 hours. Cardiac Enzymes No results for input(s): CPK, CKND1, SARA in the last 72 hours. No lab exists for component: CKRMB, TROIP   Coagulation No results for input(s): PTP, INR, APTT, INREXT in the last 72 hours. Lipid Panel No results found for: CHOL, CHOLPOCT, CHOLX, CHLST, CHOLV, 603928, HDL, HDLP, LDL, LDLC, DLDLP, [id number], VLDLC, VLDL, TGLX, TRIGL, TRIGP, TGLPOCT, CHHD, CHHDX   BNP No results for input(s): BNPP in the last 72 hours. Liver Enzymes No results for input(s): TP, ALB, TBIL, AP in the last 72 hours. No lab exists for component: SGOT, GPT, DBIL   Thyroid Studies No results found for: T4, T3U, TSH, TSHEXT         Imaging studies personally reviewed:  Significant Diagnostic Studies: No results found.           Yu Richards MD, Internal Medicine

## 2021-07-07 NOTE — PERIOP NOTES
TRANSFER - OUT REPORT:    Verbal report given to BUDDY Sol on Eugenio Lara  being transferred to [address] [place] [address] for routine progression of care       Report consisted of patients Situation, Background, Assessment and   Recommendations(SBAR). Information from the following report(s) OR Summary, Procedure Summary, Intake/Output and MAR was reviewed with the receiving nurse. Lines:   Peripheral IV 07/07/21 Posterior;Right Hand (Active)   Site Assessment Clean, dry, & intact 07/07/21 1318   Phlebitis Assessment 0 07/07/21 1318   Infiltration Assessment 0 07/07/21 1318   Dressing Status Clean, dry, & intact 07/07/21 1318   Dressing Type Tape;Transparent 07/07/21 1318   Hub Color/Line Status Pink; Infusing 07/07/21 1318   Alcohol Cap Used No 07/07/21 1318        Opportunity for questions and clarification was provided.       Patient transported with:   Registered Nurse  Tech

## 2021-07-07 NOTE — ROUTINE PROCESS
TRANSFER - IN REPORT:    Verbal report received from [facility] (name) on Nydia Pages  being received from PACU (unit) for routine post - op      Report consisted of patients Situation, Background, Assessment and   Recommendations(SBAR). Information from the following report(s) SBAR, Kardex, OR Summary, Intake/Output, MAR and Recent Results was reviewed with the receiving nurse. Opportunity for questions and clarification was provided. Assessment to be completed upon patients arrival to unit and care assumed.

## 2021-07-07 NOTE — ANESTHESIA PROCEDURE NOTES
Peripheral Block    Start time: 7/7/2021 10:22 AM  End time: 7/7/2021 10:25 AM  Performed by: Tonya Donohue MD  Authorized by: Tonya Donohue MD       Pre-procedure: Indications: at surgeon's request and post-op pain management    Preanesthetic Checklist: patient identified, risks and benefits discussed, site marked, timeout performed, anesthesia consent given and patient being monitored    Timeout Time: 10:22 EDT          Block Type:   Block Type:   Adductor canal block  Laterality:  Right  Monitoring:  Standard ASA monitoring, continuous pulse ox, frequent vital sign checks, heart rate, oxygen and responsive to questions  Injection Technique:  Single shot  Procedures: ultrasound guided    Patient Position: seated  Prep: chlorhexidine    Location:  Mid thigh  Needle Type:  Stimuplex  Needle Gauge:  21 G  Needle Localization:  Ultrasound guidance  Medication Injected:  Midazolam (VERSED) injection, 4 mg  mepivacaine (PF) 2 % (POLOCAINE) injection, 10 mL  ropivacaine (PF) (NAROPIN) 5 mg/mL (0.5 %) injection, 20 mL  dexamethasone (PF) (DECADRON) 10 mg/mL injection, 4 mg  dexmedeTOMidine (PRECEDEX) 100 mcg/mL iv solution, 25 mcg  Med Admin Time: 7/7/2021 10:25 AM    Assessment:  Number of attempts:  1  Injection Assessment:  Incremental injection every 5 mL, no paresthesia, ultrasound image on chart, local visualized surrounding nerve on ultrasound and negative aspiration for blood  Patient tolerance:  Patient tolerated the procedure well with no immediate complications  Ultra sound used to visualize needle, nerves, and local anesthetic spread

## 2021-07-07 NOTE — ANESTHESIA PROCEDURE NOTES
Peripheral Block    Start time: 7/7/2021 10:25 AM  End time: 7/7/2021 10:29 AM  Performed by: Lynn Marie MD  Authorized by: Lynn Marie MD       Pre-procedure: Indications: at surgeon's request and post-op pain management    Preanesthetic Checklist: patient identified, risks and benefits discussed, site marked, timeout performed, anesthesia consent given and patient being monitored      Block Type:   Block Type:  IPACK  Laterality:  Right  Monitoring:  Standard ASA monitoring, continuous pulse ox, heart rate, frequent vital sign checks, oxygen and responsive to questions  Injection Technique:  Single shot  Procedures: ultrasound guided    Patient Position: seated  Prep: chlorhexidine    Location:  Lower thigh  Needle Type:  Stimuplex  Needle Gauge:  21 G  Needle Localization:  Ultrasound guidance  Medication Injected:  Ropivacaine (PF) (NAROPIN) 5 mg/mL (0.5 %) injection, 12 mL  Med Admin Time: 7/7/2021 10:29 AM    Assessment:  Number of attempts:  1  Injection Assessment:  Incremental injection every 5 mL, no paresthesia, ultrasound image on chart, local visualized surrounding nerve on ultrasound, negative aspiration for blood and no intravascular symptoms  Patient tolerance:  Patient tolerated the procedure well with no immediate complications  Ultra sound used to visualize needle tip, nerves and local anesthesia spread.

## 2021-07-07 NOTE — PROGRESS NOTES
Problem: Mobility Impaired (Adult and Pediatric)  Goal: *Acute Goals and Plan of Care (Insert Text)  Description: In 1-7 days pt will be able to perform:  ST.  Bed mobility:  Rolling L to R to L modified independent for positioning. 2.  Supine to sit to supine S with HR for meals. 3.  Sit to stand to sit S with RW in prep for ambulation. LT.  Gait:  Ambulate >150ft S with RW, WBAT, for home/community mobility. 2.  Stair Negotiation:  Ascend/descend >10 steps CGA with HR for home entry. 3.  Activity tolerance: Tolerate up in chair 1-2 hours for ADL's.  4.  Patient/Family Education:  Patient/family to be independent with HEP for follow-up care and safe discharge. Note: []  Patient has met MD lazara galeana for d/c home   []  Recommend HH with 24 hour adult care   [x]  Benefit from additional acute PT session to address:  gait training and stair training    PHYSICAL THERAPY EVALUATION    Patient: Laney Moore (26 y.o. female)  Date: 2021  Primary Diagnosis: DJD (degenerative joint disease) of knee [M17.10]  Procedure(s) (LRB):  RIGHT TOTAL KNEE ARTHROPLASTY WITH CONFORMIS  **SPEC POP** (Right) Day of Surgery   Precautions:   Fall, WBAT    PLOF: Independent mobility using SPC; has rollator only for home; lives in apt w/ 16 entry steps B rail    ASSESSMENT :  Based on the objective data described below, the patient presents with decreased mobility in regards to bed mobility, transfers, gt quality and tolerance, balance, stair negotiation and safety due to R TKA surgery. Decreased AROM of R knee, dec strength of R knee, pain in R knee, dec sensation of R knee also impacting pt functional mobility. Pt rating pain on numerical pain scale pre and during session 5/10. Pt seen w/ OT for second pair of skilled hands and needed safety. Pt and daughter ed regarding mobility safety, WB, HEP, ice application/use, elevation, environmental safety and need to use call bell for OOB activity.   Pt able to perform supine>sit w/ CGA additional time and sit<>stand w/ CGA x1-2 additional time. Safety vc required throughout session to reinforce safety. Pt able to participate in gt training using RW, GB, WBAT and CGA x1-2 w/ antalgic gt pattern. Pt requesting to use bathroom and pt sat on commode. Upon sitting pt c/o feeling hot, became clammy and loss of responsiveness. Call gilman used and Nurse Dimitri Kirk arrived, called RRT. Pt w/ pulse, SpO2 100% but no response. Pt required total A x3 for commode>w/c transfer, care taken for R knee. W/c taken to bed and pt required total Ax4 for transfer where RRT took over. Consider close monitoring of pt for future therapy and consider Ax2 for safety. Consider HHPT vs SNF depending on progress upon hospital d/c. Patient will benefit from skilled intervention to address the above impairments. Patient's rehabilitation potential is considered to be Good  Factors which may influence rehabilitation potential include:   []         None noted  []         Mental ability/status  []         Medical condition  []         Home/family situation and support systems  []         Safety awareness  [x]         Pain tolerance/management  []         Other:      PLAN :  Recommendations and Planned Interventions:   [x]           Bed Mobility Training             []    Neuromuscular Re-Education  [x]           Transfer Training                   []    Orthotic/Prosthetic Training  [x]           Gait Training                          [x]    Modalities  [x]           Therapeutic Exercises           [x]    Edema Management/Control  [x]           Therapeutic Activities            [x]    Family Training/Education  [x]           Patient Education  []           Other (comment):    Frequency/Duration: Patient will be followed by physical therapy 1-2 times per day/4-7 days per week to address goals. Discharge Recommendations:  To Be Determined  Further Equipment Recommendations for Discharge: DENNYS SUBJECTIVE:   Patient stated I can try.     OBJECTIVE DATA SUMMARY:     Past Medical History:   Diagnosis Date    Anxiety     Arthritis     Athyroidism (acquired)     goiter issues    GERD (gastroesophageal reflux disease)     Hypothyroid     Intestinal malabsorption     Morbid obesity ([facility] [address]     Morbid obesity with body mass index (BMI) of 40.0 to 49.9 ([facility])     Smoking history     quit 2000    Status post gastric bypass for obesity 2005    laparoscopic / Courtland David     Past Surgical History:   Procedure Laterality Date    HX HEENT  2000    thyroidectomy    HX KNEE ARTHROSCOPY Right     HX LAP GASTRIC BYPASS  2005    [facility]    HX THYROIDECTOMY      HX TUBAL LIGATION       Barriers to Learning/Limitations: yes;  anesthesia  Compensate with: Visual Cues, Verbal Cues, and Tactile Cues  Home Situation:  Home Situation  Home Environment: Private residence  # Steps to Enter: 16  Rails to Enter: Yes  Hand Rails : Bilateral  One/Two Story Residence: One story  Living Alone: No  Support Systems: Family member(s), Child(gina)  Patient Expects to be Discharged to[de-identified] House  Current DME Used/Available at Home: Cane, straight, Walker, rolling, Grab bars  Tub or Shower Type: Tub/Shower combination  Critical Behavior:  Neurologic State: Alert  Orientation Level: Oriented X4  Cognition: Follows commands  Safety/Judgement: Awareness of environment  Psychosocial  Patient Behaviors: Calm; Cooperative  Family  Behaviors: Calm;Supportive  Skin Condition/Temp: Warm  Family  Behaviors: Calm;Supportive  Skin Integrity: Incision (comment) (R knee)  Skin Integumentary  Skin Color: Appropriate for ethnicity  Skin Condition/Temp: Warm  Skin Integrity: Incision (comment) (R knee)  Strength:    Strength: Generally decreased, functional  Tone & Sensation:   Tone: Normal  Sensation: Impaired (R knee)  Range Of Motion:  AROM: Generally decreased, functional  Functional Mobility:  Bed Mobility:  Supine to Sit: Contact guard assistance; Additional time  Sit to Supine: Total assistance (x4; RRT)  Scooting: Contact guard assistance  Transfers:  Sit to Stand: Contact guard assistance (vc)  Stand to Sit: Contact guard assistance (x2;  RRT total x2)  Balance:   Sitting: Intact  Standing: Intact; With support  Ambulation/Gait Training:  Distance (ft): 15 Feet (ft)  Assistive Device: Walker, rolling;Gait belt  Ambulation - Level of Assistance: Contact guard assistance;Assist x2; Additional time (vc)  Gait Abnormalities: Antalgic;Decreased step clearance  Right Side Weight Bearing: As tolerated  Base of Support: Narrowed; Shift to left  Stance: Right decreased  Speed/Jody: Slow  Step Length: Left shortened;Right shortened  Swing Pattern: Left asymmetrical;Right asymmetrical  Interventions: Safety awareness training; Tactile cues; Verbal cues; Visual/Demos  Therapeutic Exercises:   Encouraged HEP  Pain:  Pain level pre-treatment: 5/10   Pain level post-treatment: 5/10   Pain Intervention(s) : Medication (see MAR); Rest, Ice, Repositioning  Response to intervention: Nurse notified, See doc flow    Activity Tolerance:   Fair -/poor  Please refer to the flowsheet for vital signs taken during this treatment. After treatment:   []         Patient left in no apparent distress sitting up in chair  [x]         Patient left in bed w/ attending RRT present  []         Call bell left within reach  []         Nursing notified  []         Caregiver present  []         Bed alarm activated  []         SCDs applied    COMMUNICATION/EDUCATION:   [x]         Role of Physical Therapy in the acute care setting. [x]         Fall prevention education was provided and the patient/caregiver indicated understanding. [x]         Patient/family have participated as able in goal setting and plan of care. [x]         Patient/family agree to work toward stated goals and plan of care.   []         Patient understands intent and goals of therapy, but is neutral about his/her participation. []         Patient is unable to participate in goal setting/plan of care: ongoing with therapy staff.  []         Other:     Thank you for this referral.  Elda Castillo, PT   Time Calculation: 49 mins      Eval Complexity: History: HIGH Complexity :3+ comorbidities / personal factors will impact the outcome/ POC Exam:MEDIUM Complexity : 3 Standardized tests and measures addressing body structure, function, activity limitation and / or participation in recreation  Presentation: HIGH Complexity : Unstable and unpredictable characteristics  Clinical Decision Making:Low Complexity    Overall Complexity:LOW

## 2021-07-07 NOTE — PERIOP NOTES
Reviewed PTA medication list with patient/caregiver and patient/caregiver denies any additional medications. Patient admits to having a responsible adult care for them at home for at least 24 hours after surgery. Patient encouraged to use gown warming system and informed that using said warming gown to regulate body temperature prior to a procedure has been shown to help reduce the risks of blood clots and infection. Patient's pharmacy of choice verified and documented in PTA medication section. Dual skin assessment & fall risk band verification completed with MAXIM Yeung RN.

## 2021-07-07 NOTE — PROGRESS NOTES
1718 -200 BS     Total of 1.2 mg of Narcan do as verbally ordered by Sarah Diaz at bedside in increments of 0.4 mg.

## 2021-07-07 NOTE — PERIOP NOTES
TRANSFER - IN REPORT:    Verbal report received from BUDDY Jain and Dr Keith Johnson on Jeanell Nine  being received from OR for routine progression of care      Report consisted of patients Situation, Background, Assessment and   Recommendations(SBAR). Information from the following report(s) OR Summary, Procedure Summary, Intake/Output and MAR was reviewed with the receiving nurse. Opportunity for questions and clarification was provided. Assessment completed upon patients arrival to unit and care assumed.

## 2021-07-07 NOTE — PERIOP NOTES
Visit Vitals  /71   Pulse 67   Temp 98.6 °F (37 °C)   Resp 12   Ht 5' 5\" (1.651 m)   Wt 129.9 kg (286 lb 6.4 oz)   SpO2 99%   BMI 47.66 kg/m²       Intake/Output Summary (Last 24 hours) at 7/7/2021 1405  Last data filed at 7/7/2021 1347  Gross per 24 hour   Intake 1700 ml   Output 50 ml   Net 1650 ml

## 2021-07-07 NOTE — ADDENDUM NOTE
Addendum  created 07/07/21 1328 by Giancarlo Moore., CRNA    Flowsheet accepted, Intraprocedure Flowsheets edited

## 2021-07-07 NOTE — ANESTHESIA POSTPROCEDURE EVALUATION
Post-Anesthesia Evaluation and Assessment    Cardiovascular Function/Vital Signs  Visit Vitals  /73   Pulse 88   Temp 37 °C (98.6 °F)   Resp 10   Ht 5' 5\" (1.651 m)   Wt 129.9 kg (286 lb 6.4 oz)   SpO2 98%   BMI 47.66 kg/m²       Patient is status post Procedure(s):  RIGHT TOTAL KNEE ARTHROPLASTY WITH CONFORMIS  **SPEC POP**. Nausea/Vomiting: Controlled. Postoperative hydration reviewed and adequate. Pain:  Pain Scale 1: FLACC (07/07/21 1320)  Pain Intensity 1: 0 (07/07/21 1320)   Managed. Neurological Status:   Neuro (WDL): Exceptions to WDL (07/07/21 1320)   At baseline. Mental Status and Level of Consciousness: Arousable. Pulmonary Status:   O2 Device: Nasal cannula (07/07/21 1320)   Adequate oxygenation and airway patent. Complications related to anesthesia: None    Post-anesthesia assessment completed. No concerns. Patient has met all discharge requirements. Signed By: Belia Cabrales MD    July 7, 2021               Procedure(s):  RIGHT TOTAL KNEE ARTHROPLASTY WITH CONFORMIS  **SPEC POP**.    general, regional    <BSHSIANPOST>    INITIAL Post-op Vital signs:   Vitals Value Taken Time   /74 07/07/21 1315   Temp 37 °C (98.6 °F) 07/07/21 1251   Pulse 67 07/07/21 1320   Resp 15 07/07/21 1320   SpO2 98 % 07/07/21 1320   Vitals shown include unvalidated device data.

## 2021-07-07 NOTE — PERIOP NOTES
2 attempts to call report to [facility], [address] [place] [address]. First attempt - no answer. Second time not available.

## 2021-07-08 ENCOUNTER — HOSPITAL ENCOUNTER (OUTPATIENT)
Dept: CT IMAGING | Age: 46
Setting detail: OBSERVATION
Discharge: HOME | DRG: 302 | End: 2021-07-08
Attending: HOSPITALIST | Admitting: ORTHOPAEDIC SURGERY
Payer: MEDICAID

## 2021-07-08 ENCOUNTER — APPOINTMENT (OUTPATIENT)
Dept: NON INVASIVE DIAGNOSTICS | Age: 46
DRG: 302 | End: 2021-07-08
Attending: INTERNAL MEDICINE
Payer: MEDICAID

## 2021-07-08 PROBLEM — R41.89 UNRESPONSIVENESS: Status: RESOLVED | Noted: 2021-07-07 | Resolved: 2021-07-08

## 2021-07-08 PROBLEM — Z96.651 STATUS POST TOTAL RIGHT KNEE REPLACEMENT: Status: ACTIVE | Noted: 2021-07-08

## 2021-07-08 PROBLEM — I95.9 HYPOTENSION: Status: ACTIVE | Noted: 2021-07-08

## 2021-07-08 PROBLEM — M17.9 DJD (DEGENERATIVE JOINT DISEASE) OF KNEE: Status: RESOLVED | Noted: 2021-07-07 | Resolved: 2021-07-08

## 2021-07-08 LAB
ALBUMIN SERPL-MCNC: 2.8 G/DL (ref 3.4–5)
ALBUMIN/GLOB SERPL: 1 {RATIO} (ref 0.8–1.7)
ALP SERPL-CCNC: 70 U/L (ref 45–117)
ALT SERPL-CCNC: 13 U/L (ref 13–56)
ANION GAP SERPL CALC-SCNC: 5 MMOL/L (ref 3–18)
AST SERPL-CCNC: 9 U/L (ref 10–38)
BASOPHILS # BLD: 0 K/UL (ref 0–0.1)
BASOPHILS NFR BLD: 0 % (ref 0–2)
BILIRUB SERPL-MCNC: 0.7 MG/DL (ref 0.2–1)
BUN SERPL-MCNC: 11 MG/DL (ref 7–18)
BUN/CREAT SERPL: 12 (ref 12–20)
CALCIUM SERPL-MCNC: 8 MG/DL (ref 8.5–10.1)
CHLORIDE SERPL-SCNC: 109 MMOL/L (ref 100–111)
CO2 SERPL-SCNC: 27 MMOL/L (ref 21–32)
CREAT SERPL-MCNC: 0.9 MG/DL (ref 0.6–1.3)
DIFFERENTIAL METHOD BLD: ABNORMAL
ECHO AV ANNULUS DIAM: 2.56 CM
ECHO AV AREA PEAK VELOCITY: 2.32 CM2
ECHO AV AREA VTI: 2.19 CM2
ECHO AV AREA/BSA PEAK VELOCITY: 1 CM2/M2
ECHO AV AREA/BSA VTI: 1 CM2/M2
ECHO AV MEAN GRADIENT: 7.05 MMHG
ECHO AV PEAK GRADIENT: 11.52 MMHG
ECHO AV PEAK VELOCITY: 170 CM/S
ECHO AV VTI: 38.66 CM
ECHO IVC PROX: 1.4 CM
ECHO LA VOL 2C: 27.52 ML (ref 22–52)
ECHO LA VOL 4C: 49.07 ML (ref 22–52)
ECHO LA VOL BP: 38.23 ML (ref 22–52)
ECHO LV E' LATERAL VELOCITY: 11 CM/S
ECHO LV E' SEPTAL VELOCITY: 9 CM/S
ECHO LV EDV A2C: 66.62 ML
ECHO LV EDV A4C: 107.47 ML
ECHO LV EDV BP: 86.11 ML (ref 56–104)
ECHO LV EJECTION FRACTION A2C: 52 %
ECHO LV EJECTION FRACTION A4C: 58 %
ECHO LV EJECTION FRACTION BIPLANE: 54.2 % (ref 55–100)
ECHO LV ESV A2C: 31.7 ML
ECHO LV ESV A4C: 44.6 ML
ECHO LV ESV BP: 39.45 ML (ref 19–49)
ECHO LV INTERNAL DIMENSION DIASTOLIC: 4.82 CM (ref 3.9–5.3)
ECHO LV INTERNAL DIMENSION SYSTOLIC: 3.13 CM
ECHO LV IVSD: 1.35 CM (ref 0.6–0.9)
ECHO LV MASS 2D: 224.5 G (ref 67–162)
ECHO LV MASS INDEX 2D: 97.6 G/M2 (ref 43–95)
ECHO LV POSTERIOR WALL DIASTOLIC: 1.08 CM (ref 0.6–0.9)
ECHO LVOT CARDIAC OUTPUT: 14.67 L/MIN
ECHO LVOT DIAM: 2.15 CM
ECHO LVOT PEAK GRADIENT: 4.65 MMHG
ECHO LVOT PEAK VELOCITY: 108 CM/S
ECHO LVOT SV: 84.7 ML
ECHO LVOT VTI: 23.25 CM
ECHO MV A VELOCITY: 71 CM/S
ECHO MV AREA PHT: 2.68 CM2
ECHO MV E DECELERATION TIME (DT): 282.58 MS
ECHO MV E VELOCITY: 66 CM/S
ECHO MV E/A RATIO: 0.93
ECHO MV E/E' LATERAL: 6
ECHO MV E/E' RATIO (AVERAGED): 6.67
ECHO MV E/E' SEPTAL: 7.33
ECHO MV PRESSURE HALF TIME (PHT): 81.95 MS
ECHO RA AREA 4C: 21.44 CM2
ECHO RV INTERNAL DIMENSION: 3.69 CM
ECHO RV TAPSE: 3.2 CM (ref 1.5–2)
EOSINOPHIL # BLD: 0 K/UL (ref 0–0.4)
EOSINOPHIL NFR BLD: 0 % (ref 0–5)
ERYTHROCYTE [DISTWIDTH] IN BLOOD BY AUTOMATED COUNT: 15.8 % (ref 11.6–14.5)
GLOBULIN SER CALC-MCNC: 2.9 G/DL (ref 2–4)
GLUCOSE BLD STRIP.AUTO-MCNC: 127 MG/DL (ref 70–110)
GLUCOSE SERPL-MCNC: 120 MG/DL (ref 74–99)
HCT VFR BLD AUTO: 31.5 % (ref 35–45)
HGB BLD-MCNC: 10.2 G/DL (ref 12–16)
LACTATE SERPL-SCNC: 0.9 MMOL/L (ref 0.4–2)
LACTATE SERPL-SCNC: 1.9 MMOL/L (ref 0.4–2)
LVOT MG: 2.18 MMHG
LYMPHOCYTES # BLD: 1.9 K/UL (ref 0.9–3.6)
LYMPHOCYTES NFR BLD: 15 % (ref 21–52)
MCH RBC QN AUTO: 30.4 PG (ref 24–34)
MCHC RBC AUTO-ENTMCNC: 32.4 G/DL (ref 31–37)
MCV RBC AUTO: 93.8 FL (ref 74–97)
MONOCYTES # BLD: 1 K/UL (ref 0.05–1.2)
MONOCYTES NFR BLD: 8 % (ref 3–10)
NEUTS SEG # BLD: 9.6 K/UL (ref 1.8–8)
NEUTS SEG NFR BLD: 77 % (ref 40–73)
PLATELET # BLD AUTO: 284 K/UL (ref 135–420)
PMV BLD AUTO: 9.3 FL (ref 9.2–11.8)
POTASSIUM SERPL-SCNC: 3.9 MMOL/L (ref 3.5–5.5)
PROT SERPL-MCNC: 5.7 G/DL (ref 6.4–8.2)
RBC # BLD AUTO: 3.36 M/UL (ref 4.2–5.3)
SODIUM SERPL-SCNC: 141 MMOL/L (ref 136–145)
WBC # BLD AUTO: 12.6 K/UL (ref 4.6–13.2)

## 2021-07-08 PROCEDURE — 65270000029 HC RM PRIVATE

## 2021-07-08 PROCEDURE — 36415 COLL VENOUS BLD VENIPUNCTURE: CPT

## 2021-07-08 PROCEDURE — 93306 TTE W/DOPPLER COMPLETE: CPT

## 2021-07-08 PROCEDURE — 85025 COMPLETE CBC W/AUTO DIFF WBC: CPT

## 2021-07-08 PROCEDURE — 99218 HC RM OBSERVATION: CPT

## 2021-07-08 PROCEDURE — 97535 SELF CARE MNGMENT TRAINING: CPT

## 2021-07-08 PROCEDURE — 74011250636 HC RX REV CODE- 250/636: Performed by: ORTHOPAEDIC SURGERY

## 2021-07-08 PROCEDURE — 74011250637 HC RX REV CODE- 250/637: Performed by: ORTHOPAEDIC SURGERY

## 2021-07-08 PROCEDURE — 77010033678 HC OXYGEN DAILY

## 2021-07-08 PROCEDURE — P9047 ALBUMIN (HUMAN), 25%, 50ML: HCPCS | Performed by: HOSPITALIST

## 2021-07-08 PROCEDURE — 97530 THERAPEUTIC ACTIVITIES: CPT

## 2021-07-08 PROCEDURE — 74011250636 HC RX REV CODE- 250/636: Performed by: HOSPITALIST

## 2021-07-08 PROCEDURE — 70450 CT HEAD/BRAIN W/O DYE: CPT

## 2021-07-08 PROCEDURE — 74011250636 HC RX REV CODE- 250/636: Performed by: FAMILY MEDICINE

## 2021-07-08 PROCEDURE — 71275 CT ANGIOGRAPHY CHEST: CPT

## 2021-07-08 PROCEDURE — 80053 COMPREHEN METABOLIC PANEL: CPT

## 2021-07-08 PROCEDURE — 74011000258 HC RX REV CODE- 258: Performed by: HOSPITALIST

## 2021-07-08 PROCEDURE — 83605 ASSAY OF LACTIC ACID: CPT

## 2021-07-08 PROCEDURE — 74011000636 HC RX REV CODE- 636: Performed by: HOSPITALIST

## 2021-07-08 PROCEDURE — 74177 CT ABD & PELVIS W/CONTRAST: CPT

## 2021-07-08 PROCEDURE — 82962 GLUCOSE BLOOD TEST: CPT

## 2021-07-08 RX ORDER — ACETAMINOPHEN 325 MG/1
650 TABLET ORAL EVERY 6 HOURS
Qty: 120 TABLET | Refills: 1 | Status: SHIPPED | OUTPATIENT
Start: 2021-07-08

## 2021-07-08 RX ORDER — OXYCODONE HYDROCHLORIDE 5 MG/1
5 TABLET ORAL
Qty: 30 TABLET | Refills: 0 | Status: SHIPPED | OUTPATIENT
Start: 2021-07-08 | End: 2021-07-22

## 2021-07-08 RX ORDER — ALBUMIN HUMAN 250 G/1000ML
25 SOLUTION INTRAVENOUS EVERY 6 HOURS
Status: DISCONTINUED | OUTPATIENT
Start: 2021-07-08 | End: 2021-07-10

## 2021-07-08 RX ORDER — SODIUM CHLORIDE 9 MG/ML
150 INJECTION, SOLUTION INTRAVENOUS CONTINUOUS
Status: DISCONTINUED | OUTPATIENT
Start: 2021-07-08 | End: 2021-07-10

## 2021-07-08 RX ORDER — GUAIFENESIN 100 MG/5ML
162 LIQUID (ML) ORAL DAILY
Qty: 60 TABLET | Refills: 0 | Status: SHIPPED | OUTPATIENT
Start: 2021-07-08

## 2021-07-08 RX ADMIN — PIPERACILLIN AND TAZOBACTAM 3.38 G: 3; .375 INJECTION, POWDER, LYOPHILIZED, FOR SOLUTION INTRAVENOUS at 20:57

## 2021-07-08 RX ADMIN — LEVOTHYROXINE SODIUM 125 MCG: 0.03 TABLET ORAL at 11:15

## 2021-07-08 RX ADMIN — SODIUM CHLORIDE 150 ML/HR: 900 INJECTION, SOLUTION INTRAVENOUS at 03:00

## 2021-07-08 RX ADMIN — ALBUMIN (HUMAN) 25 G: 0.25 INJECTION, SOLUTION INTRAVENOUS at 18:45

## 2021-07-08 RX ADMIN — PANTOPRAZOLE SODIUM 40 MG: 40 TABLET, DELAYED RELEASE ORAL at 11:15

## 2021-07-08 RX ADMIN — KETOROLAC TROMETHAMINE 15 MG: 30 INJECTION, SOLUTION INTRAMUSCULAR; INTRAVENOUS at 06:29

## 2021-07-08 RX ADMIN — KETOROLAC TROMETHAMINE 15 MG: 30 INJECTION, SOLUTION INTRAMUSCULAR; INTRAVENOUS at 11:15

## 2021-07-08 RX ADMIN — ACETAMINOPHEN 650 MG: 325 TABLET, FILM COATED ORAL at 18:45

## 2021-07-08 RX ADMIN — FAMOTIDINE 40 MG: 20 TABLET, FILM COATED ORAL at 11:15

## 2021-07-08 RX ADMIN — IOPAMIDOL 100 ML: 755 INJECTION, SOLUTION INTRAVENOUS at 03:00

## 2021-07-08 RX ADMIN — ACETAMINOPHEN 650 MG: 325 TABLET, FILM COATED ORAL at 11:15

## 2021-07-08 RX ADMIN — ACETAMINOPHEN 650 MG: 325 TABLET, FILM COATED ORAL at 06:29

## 2021-07-08 RX ADMIN — ENOXAPARIN SODIUM 40 MG: 40 INJECTION SUBCUTANEOUS at 01:18

## 2021-07-08 RX ADMIN — SODIUM CHLORIDE 150 ML/HR: 900 INJECTION, SOLUTION INTRAVENOUS at 21:04

## 2021-07-08 RX ADMIN — PIPERACILLIN AND TAZOBACTAM 3.38 G: 3; .375 INJECTION, POWDER, LYOPHILIZED, FOR SOLUTION INTRAVENOUS at 01:21

## 2021-07-08 RX ADMIN — Medication 10 ML: at 13:44

## 2021-07-08 RX ADMIN — ACETAMINOPHEN 650 MG: 325 TABLET, FILM COATED ORAL at 01:17

## 2021-07-08 RX ADMIN — ALBUMIN (HUMAN) 25 G: 0.25 INJECTION, SOLUTION INTRAVENOUS at 13:44

## 2021-07-08 RX ADMIN — ALBUMIN (HUMAN) 25 G: 0.25 INJECTION, SOLUTION INTRAVENOUS at 23:17

## 2021-07-08 RX ADMIN — ACETAMINOPHEN 650 MG: 325 TABLET, FILM COATED ORAL at 23:17

## 2021-07-08 RX ADMIN — VANCOMYCIN HYDROCHLORIDE 1000 MG: 1 INJECTION, POWDER, LYOPHILIZED, FOR SOLUTION INTRAVENOUS at 11:15

## 2021-07-08 RX ADMIN — PIPERACILLIN AND TAZOBACTAM 3.38 G: 3; .375 INJECTION, POWDER, LYOPHILIZED, FOR SOLUTION INTRAVENOUS at 11:16

## 2021-07-08 RX ADMIN — VANCOMYCIN HYDROCHLORIDE 1000 MG: 1 INJECTION, POWDER, LYOPHILIZED, FOR SOLUTION INTRAVENOUS at 20:57

## 2021-07-08 RX ADMIN — Medication 10 ML: at 06:29

## 2021-07-08 NOTE — PROGRESS NOTES
Patient Name: Romario Salmeron   Age: 50 y.o. Sex:  female  YOB: 1975   Medical Record Number: [id number]      Antimicrobial  Vancomycin   Indication Empiric Therapy   Dose / Interval           Current Antimicrobial Therapy (168h ago, onward)       Ordered     Start Stop    21 0649  vancomycin (VANCOCIN) 1,000 mg in 0.9% sodium chloride 250 mL (VIAL-MATE)  1,000 mg,   IntraVENous,   EVERY 12 HOURS      21 1000 --    21 1738  piperacillin-tazobactam (ZOSYN) 3.375 g in 0.9% sodium chloride (MBP/ADV) 100 mL MBP  3.375 g,   IntraVENous,   EVERY 8 HOURS      21 1800 --                     Last Level (if applicable) No results found for: DOSE, TMG, DTG  Vancomycin   No results found for: VANCP, VANCT, VANCR   Gentamicin   No results found for: GENP, GENT, GENR]  Tobramycin   No results found for: TOBP, TOBT, TOBR   Amikacin   No results found for: Heri Jubilee, AMIKT, DAMIKT, DAMIKR     Cultures (7 most recent)   No results found for: GMS, CULT   Renal Overview (72 hr)         Recent Labs     21   BUN 11 12 12   CREA 0.90 0.96 0.58*       CrCl (Current): Estimated Creatinine Clearance: 105.2 mL/min (based on SCr of 0.9 mg/dL). Lactic Acid    (Sepsis Criteria: >2.0 mmol/L) Lab Results   Component Value Date/Time    Lactic acid 1.9 2021 05:02 AM    Lactic acid 2.5 (HH) 2021 10:45 PM      Procalcitonin (0.10-0.49 NG/ML) No results found for: PCT   Hepatic Overview (72 hr) Recent Labs     21  05021  184   ALT 13   < > 16   < > 13   AP 70  --  82  --  67    < > = values in this interval not displayed.       Temp (24-hr Max) Temp (24hrs), Av.2 °F (36.8 °C), Min:97.6 °F (36.4 °C), Max:98.9 °F (37.2 °C)       Hematology Recent Labs     21  0502 21  2245 21  1845   WBC  --   --  14.3*   HGB  --   --  10.8*   HCT  --   --  32.9*   PLT  --   --  301 LAC 1.9 2.5*  --         DOT  1     Notes   Dosing Recommendations  Vancomycin dose: 1000 mg IV Q12hrs (infused over 1 hr)  Estimated AUC/LACHO: 483 mcg*hr/mL (assumed LACHO 1 mcg/mL)  Estimated peak: 25.4 mcg/mL  Estimated trough: 15.6 mcg/mL  Therapeutic target: AUC/LACHO 400 to 600 mcg*hr/mL    A/P:  1. Recommend vancomycin 1000 mg IV Q12hrs (7.5 mg/kg)  2. [Discuss when next vancomycin level(s) should be obtained based on clinical factors and/or institution policy]  3. Monitor renal function (urine output, BUN/SCr). Dose adjustments may be necessary with a significant change in renal function. 4. vancomycin loading dose of 2000 mg to facilitate rapid attainment of target serum vancomycin levels.          Fred Chaparro [facility]  Clinical Pharmacist  [phone]

## 2021-07-08 NOTE — PROGRESS NOTES
OVERNIGHT COVERAGE:     Duplex study: no DVT     CTA chest: has not been done yet     CBC: mild leukocytosis, hemoglobin greater than 10 and platelet count within normal limits, continue broad-spectrum antibiotics    CMP: Within normal limits    Lactic acid: Elevated 2.5, repeat will be done in about an hour, will follow

## 2021-07-08 NOTE — PROGRESS NOTES
Transition of Care (SHER) Plan:     Chart reviewed and spoke with pt at bedside to discuss d/c planning, pt prearranged by  office with [address] [place] [address] after TKR procedure, pt transferred to icu after RRT was called due to unresponsiveness, at this time pt alert and responsive, plan is to see PT today if cleared, cm verified address and contact information, pt states she does have a home rolling walker at home. SHER Transportation:   How is patient being transported at discharge? Family/Friend      When? Once discharge criteria met     Is transport scheduled? N/A      Follow-up appointment and transportation:   PCP/Specialist?  See AVS for Appointment         Who is transporting to the follow-up appointment? Family/Friend      Is transport for follow up appointment scheduled? N/A    Communication plan (with patient/family): Who is being called? Patient or Next of Kin? Responsible party? Patient      What number(s) is to be used? See Facesheet      What service provider is calling for [facility] services? When are they calling? 24-48 hours following discharge    Readmission Risk? (Green/Low; Yellow/Moderate; Red/High):  Green  Care Management Interventions  PCP Verified by CM: Yes  Palliative Care Criteria Met (RRAT>21 & CHF Dx)?: No  Mode of Transport at Discharge: Other (see comment) (family)  Transition of Care Consult (CM Consult): [address]: No  Reason Outside [place]: Physician referred to specific agency ([facility])  Physical Therapy Consult: Yes  Occupational Therapy Consult: Yes  Current Support Network:  Other  Confirm Follow Up Transport: Family  The Patient and/or Patient Representative was Provided with a Choice of Provider and Agrees with the Discharge Plan?: Yes  Freedom of Choice List was Provided with Basic Dialogue that Supports the Patient's Individualized Plan of Care/Goals, Treatment Preferences and Shares the Quality Data Associated with the Providers?: Yes  Discharge Location  Discharge Placement: Home with home health

## 2021-07-08 NOTE — DISCHARGE SUMMARY
Discharge Summary    Patient: Evelio Weiner               Sex: female          DOA: 7/7/2021         YOB: 1975      Age:  50 y.o.        LOS:  LOS: 0 days                Admit Date: 7/7/2021    Discharge Date: 7/8/2021    Admission Diagnoses: DJD (degenerative joint disease) of knee [M17.10]    Discharge Diagnoses:    Problem List as of 7/8/2021 Date Reviewed: 7/8/2021        Codes Class Noted - Resolved    Hypothyroid ICD-10-CM: E03.9  ICD-9-CM: 244.9  Unknown - Present        Intestinal malabsorption ICD-10-CM: K90.9  ICD-9-CM: 579.9  Unknown - Present        Anxiety ICD-10-CM: F41.9  ICD-9-CM: 300.00  Unknown - Present        Athyroidism (acquired) ICD-10-CM: E03.9  ICD-9-CM: 244.9  Unknown - Present    Overview Signed 3/10/2021  9:45 AM by Carvel Meigs, PA     goiter issues             Morbid obesity ([facility] ICD-10-CM: E66.01  ICD-9-CM: 278.01  Unknown - Present        Morbid obesity with body mass index (BMI) of 40.0 to 49.9 ([facility] ICD-10-CM: E66.01  ICD-9-CM: 278.01  Unknown - Present        Smoking history ICD-10-CM: Z87.891  ICD-9-CM: V15.82  Unknown - Present    Overview Signed 3/10/2021  9:47 AM by Carvel Meigs, PA     quit 2000             Status post gastric bypass for obesity ICD-10-CM: Z98.84  ICD-9-CM: V45.86  2005 - Present    Overview Signed 3/10/2021  9:38 AM by Carvel Meigs, PA     laparoscopic / Arsalan Ernestine: DJD (degenerative joint disease) of knee ICD-10-CM: M17.10  ICD-9-CM: 715.36  7/7/2021 - 7/8/2021        RESOLVED: Unresponsiveness ICD-10-CM: R41.89  ICD-9-CM: 780.09  7/7/2021 - 7/8/2021        RESOLVED: GERD (gastroesophageal reflux disease) ICD-10-CM: K21.9  ICD-9-CM: 530.81  Unknown - 7/8/2021              Discharge Condition: Good    Hospital Course: TKR, respiratory distress, ICU    Consults: Hospitalist and Pulmonary/Critical Care    Significant Diagnostic Studies: none    Discharge Medications:     Current Discharge Medication List      START taking these medications    Details   acetaminophen (TYLENOL) 325 mg tablet Take 2 Tablets by mouth every six (6) hours. Qty: 120 Tablet, Refills: 1      oxyCODONE IR (ROXICODONE) 5 mg immediate release tablet Take 1 Tablet by mouth every six (6) hours as needed for Pain for up to 14 days. Max Daily Amount: 20 mg.  Qty: 30 Tablet, Refills: 0    Associated Diagnoses: Primary osteoarthritis of knee, unspecified laterality      aspirin 81 mg chewable tablet Take 2 Tablets by mouth daily. Qty: 60 Tablet, Refills: 0         CONTINUE these medications which have NOT CHANGED    Details   levothyroxine (SYNTHROID) 125 mcg tablet Take 125 mcg by mouth Daily (before breakfast). citalopram (CELEXA) 10 mg tablet Take 10 mg by mouth daily. famotidine (PEPCID) 40 mg tablet Take 40 mg by mouth daily. cyanocobalamin (VITAMIN B-12) 1,000 mcg sublingual tablet 1,000 mcg by SubLINGual route daily. ascorbic acid, vitamin C, (Vitamin C) 500 mg tablet Take  by mouth.       omeprazole (PRILOSEC) 40 mg capsule Take 40 mg by mouth nightly.      ergocalciferol (ERGOCALCIFEROL) 1,250 mcg (50,000 unit) capsule Vitamin D2 1,250 mcg (50,000 unit) capsule         STOP taking these medications       acetaminophen (Tylenol Arthritis Pain) 650 mg TbER Comments:   Reason for Stopping:               Activity: Activity as tolerated    Diet: Regular Diet    Wound Care: Keep wound clean and dry    Follow-up: 2 weeks

## 2021-07-08 NOTE — PROGRESS NOTES
ICU on call  50years old female OA/knee surgery/st post gastric bypass in the past/morbid obesity became unresponsive earlier today. No loss of pulse, no hypoxemia or chest pain. BP normal.   Patient earlier received dilaudid and roxicodone so possible reaction/ lethargy ? due to medication? Dr. Aaron Stephenson already gave narcan  Work up discussed with Dr. Aaron Stephenson ( cardiopulmonary work up )  ECG /trop serial  Echo in am  CMP not done I added  I recommended CTa of the chest do not  see order so  I added  I added PVL stat as well  ABG evaluated 7.36/41/104/97pateitn woke up non focal as per hospitalist  CT of the head pending  Hb is low but no previous hb to compare ? Please make sure ortho team admitting physician aware  We have discussed abx/IVF midline placement  Currently vitals and oxygenation stable on 2L I agree to monitor in  ICU  overnight  It is possible that events above are related to  opiates but still requires cardio-pulmonary basic work up  ITZEL George MD

## 2021-07-08 NOTE — PROGRESS NOTES
2030: TRANSFER - IN REPORT:    Verbal report received from AKUA Ying RN(name) on Eugenio Lara  being received from @ [facility](unit) for change in patient condition(unresponsive)      Report consisted of patients Situation, Background, Assessment and   Recommendations(SBAR). Information from the following report(s) SBAR, Kardex, OR Summary, Intake/Output, MAR, Recent Results, Med Rec Status, Cardiac Rhythm NSR/Sinus Khadijah Pimple and Alarm Parameters  was reviewed with the receiving nurse. Opportunity for questions and clarification was provided. 2030: Assessment completed upon patients arrival to unit and care assumed. Pt A&Ox4 tolerating 2LNC with SPO2 of 100%. Pt requesting bedpan at this time with no further desires expressed. Pt denies pain at this time. Awaiting BMP results to have CT imaging performed. 2100: Received call from CT concerning resulted BMP. Speaking with Dr. Beth Nunes and she confirmed CT okay to be performed and is also calling Ultrasound to come to bedside to perform duplex at this time. Family at bedside due to recent transfer and will be leaving shortly, physician aware. 2245: Attempted to take pt to CT, ultrasound tech arrived to unit to perform duplex. CT being utilized at this time. Awaiting notification from tech to have scans performed. 0000: Pt reassessed and requesting bedpan. 0010: Bedpan removed and pt resting comfortably at this time. 0130: CT being utilized at this time. Awaiting notification from tech to have scans performed. Pt requested bedpan, utilized and removed. Pt resting comfortably at this time. 0207: Paged Dr. Beth Nunes concerning persistently low BP with MAP of 61.    0217: Spoke with Dr. Beth Nunes and orders received to increase NS to 150ml/h from 75ml/h with goal to improve MAP and Lactic Acid. Redraw to be performed soon. 0250: Pt transferred to [email or web address] with RN and extern.  IV access replaced for contrast injection. 0400: Reassessment completed. 0500: Phlebotomy at bedside. 5231: Bedside and Verbal shift change report given to Magalie Sheppard RN (oncoming nurse) by Lalo Abreu RN (offgoing nurse). Report included the following information SBAR, Kardex, OR Summary, Intake/Output, MAR, Recent Results, Med Rec Status, Cardiac Rhythm NSR/Sinus Vega Alfonzo and Alarm Parameters .

## 2021-07-08 NOTE — ACP (ADVANCE CARE PLANNING)
Advance Care Planning   Advance Care Planning Inpatient Note  [facility] Department    Today's Date: 7/8/2021  Unit: THE [facility] 1 INTENSIVE CARE    Received request from IDT member. Upon review of chart and communication with care team, patient's decision making abilities are not in question. Patient was/were present in the room during visit.     Goals of ACP Conversation:  Discuss Advance Care planning documents    Health Care Decision Makers:    Advance Care Planning Documents (Patient Wishes) on file:  None         Interventions:  Provided education on documents for clarity and greater understanding     Outcomes/Plan:  ACP Discussion Completed     Electronically signed by Chaplain Haja on 7/8/2021 at 11:39 AM

## 2021-07-08 NOTE — PROGRESS NOTES
0715 Report from [address] [place] [address], [facility], was in the bathroom and went unresponsive, was brought down to ICU, CTs all negative, BP soft, will monitor  0800 Assessment, patient resting comfortably in bed, denies pain  1200 Reassessment, patient has discharge order from Dr Jer Kamara, will have PT work with her before she goes home, patient uses walker at home  0130 Patient got out of bed with PT and is sitting up in chair, this RN at bedside with nursing aid and PT, patient reports feeling hot and short of breath, patient is diaphoretic, checked blood sugar 127, HR 60-70, 100% O2 sat on room air, checked blood pressure and it was low 56/35 (39) Patient experiencing orthostatic hypotension again, same as episode that led to RRT last night, assisted patient back to bed, started liter blous on NS  1400 Called and updated Dr Emogene Kocher, she wants to keep patient in ICU overnight, do not discharge home, give scheduled doses of albumin, do not get patient out of bed to bedside commode, have  1436 Sent message on [employer] to surgeon Dr Jer Kamara to update him on patient condition  1525 Dr Jer Kamara acknowledged and replied  1600 Reassessment, patient resting in bed, denies pain and SOB, states she is feeling well and not like before  1930 Report given to Rivka Manzano RN

## 2021-07-08 NOTE — PROGRESS NOTES
Hospitalist Progress Note-critical care note     Patient: Terrance Catalan MRN: [id number]  CSN: [id number]    YOB: 1975  Age: 50 y.o. Sex: female    DOA: 7/7/2021 LOS:  LOS: 0 days            Chief complaint: rt knee replacement , hypotension     Assessment/Plan         Hospital Problems  Date Reviewed: 7/8/2021        Codes Class Noted POA    Status post total right knee replacement ICD-10-CM: Z96.651  ICD-9-CM: V43.65  7/8/2021 Unknown        Hypotension ICD-10-CM: I95.9  ICD-9-CM: 458.9  7/8/2021 Unknown        Morbid obesity ([facility] ICD-10-CM: E66.01  ICD-9-CM: 278.01  Unknown Yes             unresponsiveness   Resolved, alert and oriented   cta chest negative for PE,   Ct head/pelvic/abdomen no acute issue   Echo done pending   Continue empiric iv abx-will narrow it tomorrow if cx negative   Continue cardiac monitoring   pvl negative for dvt     Hypotension  Ns infusion   Albumin low -give albumin-need     Morbid obesity   Need lifestyle modification   Hx of bypass     Subjective : I feel fine   rn : no acute issue , will be transferred to the floor   Disposition :tbd,   Review of systems:    General: No fevers or chills. Cardiovascular: No chest pain or pressure. No palpitations. Pulmonary: No shortness of breath. Gastrointestinal: No nausea, vomiting. Vital signs/Intake and Output:  Visit Vitals  BP (!) 93/48   Pulse 65   Temp 98.9 °F (37.2 °C)   Resp 12   Ht 5' 5\" (1.651 m)   Wt 129.7 kg (286 lb)   SpO2 100%   BMI 47.59 kg/m²     Current Shift:  No intake/output data recorded. Last three shifts:  07/06 1901 - 07/08 0700  In: 1700 [I.V.:1700]  Out: 975 [Urine:925]    Physical Exam:  General: WD, WN. Alert, cooperative, no acute distress    HEENT: NC, Atraumatic. PERRLA, anicteric sclerae. Lungs: CTA Bilaterally. No Wheezing/Rhonchi/Rales. Heart:  Regular  rhythm,  No murmur, No Rubs, No Gallops  Abdomen: Soft, Non distended, Non tender.   +Bowel sounds,   Extremities: No c/c, rt leg wrapped with ace bandage   Psych:   Not anxious or agitated. Neurologic:  No acute neurological deficit. Labs: Results:       Chemistry Recent Labs     07/08/21  0502 07/07/21  2245 07/07/21  1845   * 143* 125*    140 143   K 3.9 3.8 3.5    107 113*   CO2 27 27 22   BUN 11 12 12   CREA 0.90 0.96 0.58*   CA 8.0* 8.3* 6.6*   AGAP 5 6 8   BUCR 12 13 21*   AP 70 82 67   TP 5.7* 6.4 5.2*   ALB 2.8* 3.1* 2.5*   GLOB 2.9 3.3 2.7   AGRAT 1.0 0.9 0.9      CBC w/Diff Recent Labs     07/08/21  0502 07/07/21  1845   WBC 12.6 14.3*   RBC 3.36* 3.49*   HGB 10.2* 10.8*   HCT 31.5* 32.9*    301   GRANS 77*  --    LYMPH 15*  --    EOS 0  --       Cardiac Enzymes Recent Labs     07/07/21  1845   CPK 94   CKND1 CALCULATION NOT PERFORMED WHEN RESULT IS BELOW LINEAR LIMIT      Coagulation Recent Labs     07/07/21  2245   PTP 15.8*   INR 1.3*       Lipid Panel No results found for: CHOL, CHOLPOCT, CHOLX, CHLST, CHOLV, 266863, HDL, HDLP, LDL, LDLC, DLDLP, 709675, VLDLC, VLDL, TGLX, TRIGL, TRIGP, TGLPOCT, CHHD, CHHDX   BNP No results for input(s): BNPP in the last 72 hours. Liver Enzymes Recent Labs     07/08/21  0502   TP 5.7*   ALB 2.8*   AP 70      Thyroid Studies Lab Results   Component Value Date/Time    TSH 1.99 07/07/2021 06:45 PM        Procedures/imaging: see electronic medical records for all procedures/Xrays and details which were not copied into this note but were reviewed prior to creation of Plan    CT HEAD WO CONT    Result Date: 7/8/2021  EXAM: CT head INDICATION: Altered mental status. COMPARISON: None. TECHNIQUE: Axial CT imaging of the head was performed without intravenous contrast. Dose reduction techniques used: automated exposure control, adjustment of the mAs and/or kVp according to patient size, and iterative reconstruction techniques.  Digital imaging and communications in Medicine (DICOM) format image data are available to nonaffiliated external healthcare facilities or entities on a secure, media free, reciprocally searchable basis with patient authorization for at least 12 months after this study. _______________ FINDINGS: BRAIN AND POSTERIOR FOSSA: The sulci, folia, ventricles and basal cisterns are within normal limits for the patient's age. There is no intracranial hemorrhage, mass effect, or midline shift. There are no areas of abnormal parenchymal attenuation. EXTRA-AXIAL SPACES AND MENINGES: There are no abnormal extra-axial fluid collections. CALVARIUM: Intact. SINUSES: Clear. OTHER: None. _______________     No acute intracranial abnormalities. CTA CHEST W OR W WO CONT    Addendum Date: 7/8/2021    Addendum: The purpose of this addendum is to clarify impression point #3:   > Please note that there is a 1.6 cm hypodensity left thyroid nodule. This appears essentially unchanged, predominantly obscured by contrast on the prior study of 11/15/2020.   > Addendum discussed with Dr. Es Mercedes at 11:00 AM, 7/8/2021. Result Date: 7/8/2021  EXAM: CTA chest INDICATION: Pain. COMPARISON: November 15, 2020. TECHNIQUE: Axial CT imaging from the thoracic inlet through the diaphragm with intravenous contrast. Coronal and sagittal MIP reformats were generated. Dose reduction techniques used: automated exposure control, adjustment of the mAs and/or kVp according to patient size, and iterative reconstruction techniques. Digital imaging and communications in Medicine (DICOM) format image data are available to nonaffiliated external healthcare facilities or entities on a secure, media free, reciprocally searchable basis with patient authorization for at least 12 months after this study. _______________ FINDINGS: EXAM QUALITY: Adequate. PULMONARY ARTERIES: No evidence of pulmonary embolism. MEDIASTINUM: Normal heart size. No evidence of right heart strain. Aorta is unremarkable. No pericardial effusion. LUNGS: There is mild scarring at the left lung base. No suspicious nodule or mass.  No abnormal opacities. PLEURA: Normal. AIRWAY: Normal. LYMPH NODES: No enlarged nodes. UPPER ABDOMEN: Unremarkable. OTHER: No acute or aggressive osseous abnormalities identified. There is a 1.6 cm low-density left thyroid nodule. _______________     1. No evidence of pulmonary embolism. 2.  No active cardiopulmonary disease. 3. 1.3 cm low-density left thyroid nodule. Recommend ultrasound follow-up. CT ABD PELV W CONT    Result Date: 7/8/2021  EXAM: CT of the abdomen and pelvis INDICATION: Pain. COMPARISON: None. TECHNIQUE: Axial CT imaging of the abdomen and pelvis was performed with intravenous contrast. Multiplanar reformats were generated. Dose reduction techniques used: automated exposure control, adjustment of the mAs and/or kVp according to patient size, and iterative reconstruction techniques. Digital imaging and communications in Medicine (DICOM) format image data are available to nonaffiliated external healthcare facilities or entities on a secure, media free, reciprocally searchable basis with patient authorization for at least 12 months after this study. _______________ FINDINGS: LOWER CHEST: There is minimal scarring at the left lung base. LIVER, BILIARY: Liver is normal. No biliary dilation. Gallbladder is unremarkable. PANCREAS: Normal. SPLEEN: Normal. ADRENALS: Normal. KIDNEYS: Normal. LYMPH NODES: No enlarged lymph nodes. GASTROINTESTINAL TRACT: There has been a prior gastric sleeve procedure. PELVIC ORGANS: Unremarkable. VASCULATURE: Unremarkable. BONES: No acute or aggressive osseous abnormalities identified. OTHER: None. _______________     No acute intra-abdominal process. XR CHEST PORT    Result Date: 7/7/2021  EXAM:  AP Portable Chest X-ray 1 view INDICATION: Hypoxia COMPARISON: November 15, 2020 _______________ FINDINGS:  Heart and mediastinal contours are within normal limits for portable radiograph. Lungs are clear of active disease. There are no pleural effusions.  No acute osseous findings. ________________      No acute process    DUPLEX LOWER EXT VENOUS BILAT    Result Date: 7/8/2021  · No evidence of deep vein thrombosis in the right lower extremity. · No evidence of deep vein thrombosis in the left lower extremity. Limited visualization of right lower extremity due to extensive bandages from knee surgery.        Russ Moss MD

## 2021-07-08 NOTE — PROGRESS NOTES
Problem: Self Care Deficits Care Plan (Adult)  Goal: *Acute Goals and Plan of Care (Insert Text)  Description: Initial Occupational Therapy Goals (7/7/2021) Within 7 day(s):    1. Patient will perform grooming standing sinkside with supervision for increased independence with ADLs. 2. Patient will perform LB dressing with supervision & A/E PRN for increased independence with ADLs. 3. Patient will perform toilet transfer with supervision for increased independence with ADLs. 4. Patient will perform all aspects of toileting with supervision for increased independence with ADLs. 5. Patient will independently apply energy conservation techniques with 1 verbal cue(s)for increased independence with ADLs. 6. Patient will perform bathroom mobility with supervision for increased independence/safety with ADLs. Outcome: Progressing Towards Goal    OCCUPATIONAL THERAPY TREATMENT    Patient: Shanta Rinaldi (14 y.o. female)  Date: 7/8/2021  Diagnosis: DJD (degenerative joint disease) of knee [M17.10] <principal problem not specified>  Procedure(s) (LRB):  RIGHT TOTAL KNEE ARTHROPLASTY WITH CONFORMIS  **SPEC POP** (Right) 1 Day Post-Op  Precautions: Fall, WBAT  PLOF:     Chart, occupational therapy assessment, plan of care, and goals were reviewed. ASSESSMENT:  Pt presented supine in bed at the beginning of session. BP 93/55. Recived  clearance from nursing to assist pt with OOB activity. Pt requests to use BSC at the beginning of session. Pt requires min A for bed mobility and supine to sit; min-CGA for bed<>bsc transfers, min-CGA for toileting and aman care; CG-SBA for LB dressing, independent for UB dressing. Pt was left seated in chair at the end of session in NAD, asymptomatic for low SBP.      Progression toward goals:  []          Improving appropriately and progressing toward goals  [x]          Improving slowly and progressing toward goals  []          Not making progress toward goals and plan of care will be adjusted     PLAN:  Patient continues to benefit from skilled intervention to address the above impairments. Continue treatment per established plan of care. Discharge Recommendations:  Home Health and To Be Determined  Further Equipment Recommendations for Discharge:  TBD     SUBJECTIVE:   Patient stated  I have been wanting to get on the commode since 7am.    OBJECTIVE DATA SUMMARY:   Cognitive/Behavioral Status:  Neurologic State: Alert  Orientation Level: Oriented X4  Cognition: Appropriate for age attention/concentration, Follows commands  Safety/Judgement: Fall prevention    Functional Mobility and Transfers for ADLs:   Bed Mobility:  Supine to Sit: Contact guard assistance  Sit to Supine: Contact guard assistance  Scooting: Minimum assistance;Contact guard assistance   Transfers:  Sit to Stand: Total assistance  Stand to Sit: Minimum assistance  Bed to Chair: Minimum assistance   Toilet Transfer : Minimum assistance (bed<>bsc)  Balance:  Sitting: Intact  Standing: Impaired; With support  Standing - Static: Good  Standing - Dynamic : Fair  ADL Intervention:  Upper Body Dressing Assistance  Dressing Assistance: Independent  Bra: Independent  Pullover Shirt: Independent    Lower Body Dressing Assistance  Dressing Assistance: Stand-by assistance  Underpants: Stand-by assistance  Leg Crossed Method Used: No  Cues: Bryan Horner  Toileting Assistance: Minimum assistance;Contact guard assistance  Bladder Hygiene: Contact guard assistance  Clothing Management: Minimum assistance    Cognitive Retraining  Safety/Judgement: Fall prevention  Pain:  Pain level pre-treatment: 4/10   Pain level post-treatment: 4/10  Pain Intervention(s): Medication (see MAR); Rest, Ice, Repositioning   Response to intervention: Nurse notified, See doc flow    Activity Tolerance:    Fair     Please refer to the flowsheet for vital signs taken during this treatment.   After treatment:   [x]  Patient left in no apparent distress sitting up in chair  []  Patient left in no apparent distress in bed  [x]  Call bell left within reach  [x]  Nursing notified  []  Caregiver present  []  Bed alarm activated    COMMUNICATION/EDUCATION:   [x] Role of Occupational Therapy in the acute care setting  [x] Home safety education was provided and the patient/caregiver indicated understanding. [x] Patient/family have participated as able in working towards goals and plan of care. [x] Patient/family agree to work toward stated goals and plan of care. [] Patient understands intent and goals of therapy, but is neutral about his/her participation. [] Patient is unable to participate in goal setting and plan of care.       Thank you for this referral.  Agustin Jason, OTR/L  Time Calculation: 35 mins

## 2021-07-08 NOTE — PROGRESS NOTES
1712-RRT called due to patient AMS. Patient passed out on toilet while working with PT. Patient became unresponsive, still had pulse. This nurse asked to record. 1718-0.4mg  Narcan given, patient .    1722-0.4 mg Narcan given    1722-Verbal orders from Dr. Ni Marks  for STAT: chest xray, CTA chest, CT ABD, CT head, ABGs, EKG, CBC, BMP, CMP, cardiac enzymes, magnesium, troponin, vancomycin, zosyn, blood cultures, urine culture, 500 mL NS bolus,     1728-0.4  Narcan given    1735-Order for telemetry monitoring, patient going to tele. 1735-Paged oncall physician for Dr. Michael Mendoza to notify him of patient transfer. 1748-Dr. Ni Marks to transfer patient to ICU.

## 2021-07-08 NOTE — CONSULTS
P[facility] Specialists  Pulmonary, Critical Care, and Sleep Medicine    Name: Annemarie Warren MRN: [id number]   : 1975 Hospital: [facility] [place]    Date: 2021  Room: 106/01     [facility] Note                                              Consult requesting physician: Dr. Rock Moura  Reason for Consult: Unresponsiveness      IMPRESSION:   Unresponsiveness R41.89     Hypotension I95.9 ·     ·   Patient Active Problem List   Diagnosis Code    Hypothyroid E03.9    Status post gastric bypass for obesity Z98.84    Intestinal malabsorption K90.9    Anxiety F41.9    Athyroidism (acquired) E03.9    Morbid obesity ([facility] [address] E66.01    Morbid obesity with body mass index (BMI) of 40.0 to 49.9 ([facility]) E66.01    Smoking history Z87.891    Status post total right knee replacement Z96.651    Hypotension I95.9       · Code status: Full code      RECOMMENDATIONS:   Respiratory:   No acute issues. Protecting airway. On room air. Keep SPO2 >=92%. HOB 30 degree elevation all the time. Aggressive pulmonary toileting. Aspiration precautions. Incentive spirometry. CVS: Mild borderline low blood pressure with SBP in 90s; baseline at low 100s. Denies lightheadedness or dizziness. Continue IV fluid. Cardiac enzymes unremarkable. Echo pending. ID: Mild leukocytosis, likely postop or due to stress; resolved. No fever. Very mild lactic acidosis, resolved. Blood culture: NGTD. Antibiotics: Vancomycin and Zosyn started during RRT by hospitalist team; recommend very short course or stopping antibiotics if there is no signs of surgical site infection. Defer to hospitalist team.  Follow cultures. Deescalate antibiotic when appropriate. Musculoskeletal: S/p right TKR; site under dressing; being followed by orthopedics. PT OT following. Hematology/Oncology: Anemia, likely chronic. Estimated blood loss intraoperatively only 50 mL; reported.   Renal: Normal creatinine and electrolytes; monitor urine output and electrolytes and creatinine. GI/: No acute issues. Endocrine: Monitor BS. 1.6 cm low-density left thyroid nodule incidentally found on CTA chest 7/8/2021; radiologist recommended ultrasound follow-up. Defer to hospitalist team.  Neurology: Unresponsiveness while sitting on toilet; likely vasovagal syncope. Echo pending. CT head unremarkable. Mental status normalized. Toxicology: No acute issues. Pain/Sedation: On narcotic pain medication as needed for right knee surgical site pain; recommend judicious use of pain medication. Skin/Wound: Local dressing at right knee surgical site  Electrolytes: Replace electrolytes per ICU electrolyte replacement protocol. IVF: NS at 150 mL/h. Nutrition: P.o. diet  Prophylaxis: DVT Prophylaxis: SCD/Lovenox. GI Prophylaxis: Pepcid. Restraints: none  Lines/Tubes: PIV  Patient does not have Rivera catheter or central line. PT/OT eval and treat. OOB. Will defer respective systems problem management to primary and other respective consultant and follow patient in ICU with primary and other medical team.  Further recommendations will be based on the patient's response to recommended treatment and results of the investigation ordered. Quality Care: PPI, DVT prophylaxis, HOB elevated, Infection control all reviewed and addressed. Care of plan d/w hospitalist team, RN, RT, MDR.  D/w patient (answered all questions to satisfaction). High complexity decision making was performed during the evaluation of this patient at high risk for decompensation with multiple organ involvement. CC time excluding patient/family discussion and education: 33 minutes. Since patient medical condition has improved; transfer to remote telemetry. Once transferred; PCCM will sign off; call us with any questions.          Subjective/History of Present Illness:     Patient is a 50 y.o. female with PMHx significant for Obese s/p gastric bypass 2005, OA s/p right TKR on 7/7/21; RRT for unresponsiveness while on the toilet on 7/7/2021 (no cardiorespiratory arrest or CPR needed); transferred to ICU for close monitoring. Patient became unresponsive while sitting on toilet on 7/7/2021; weakness by physical therapist.  Did not lose pulse and did not stop breathing; did not require CPR. Patient had received narcotic pain medication prior to this episode. Received Narcan during RRT. CTA negative for PE  CT head unremakable. CT abdomen/pelvis unremarkable. 7/8/2021:  Seen in ICU room 106. Patient was transferred after RRT and unresponsiveness while sitting on a toilet on 7/7/2021. Extensive work-up unremarkable including CTA chest negative for PE; CT head unremarkable; CT abdomen/pelvis unremarkable. On room air with SPO2 high 90s. SBP in 90s; her baseline is low 100s. Mental status completely normal now. No focal deficit. Denies lightheadedness, dizziness, chest pain, fever, cough, dyspnea, calf pain. Right knee under dressing. No other overnight issues reported. [facility] was not called for any other issues overnight. I/O last 24 hrs: Intake/Output Summary (Last 24 hours) at 7/8/2021 1106  Last data filed at 7/8/2021 0400  Gross per 24 hour   Intake 1500 ml   Output 975 ml   Net 525 ml         The patient is critically ill     History taken from patient, EMR     Review of Systems:   HEENT: No epistaxis, no nasal drainage, no difficulty in swallowing, no redness in eyes  Respiratory: as above  Cardiovascular: no chest pain, no palpitations, no chronic leg edema, no syncope  Gastrointestinal: no abd pain, no vomiting, no diarrhea, no bleeding symptoms  Genitourinary: No urinary symptoms or hematuria  Musculoskeletal: Right knee under dressing. Pain is controlled.   Neurological: No focal weakness, no seizures, no headaches  Behvioral/Psych: No anxiety, no depression  Constitutional: No fever, no chills, no weight loss, no night sweats     Allergies   Allergen Reactions    Codeine Other (comments)     Burning stomach and chest        Tylenol-Codeine #3 [Acetaminophen-Codeine] Other (comments)     Can take tylenol      Past Medical History:   Diagnosis Date    Anxiety     Arthritis     Athyroidism (acquired)     goiter issues    GERD (gastroesophageal reflux disease)     Hypothyroid     Intestinal malabsorption     Morbid obesity ([facility] [address]     Morbid obesity with body mass index (BMI) of 40.0 to 49.9 ([facility])     Smoking history     quit     Status post gastric bypass for obesity     laparoscopic / [facility] [place]      Past Surgical History:   Procedure Laterality Date    HX HEENT      thyroidectomy    HX KNEE ARTHROSCOPY Right     HX LAP GASTRIC BYPASS  2005    [facility]    HX THYROIDECTOMY      HX TUBAL LIGATION        Social History     Tobacco Use    Smoking status: Former Smoker     Quit date:      Years since quittin.5    Smokeless tobacco: Never Used   Substance Use Topics    Alcohol use: Yes     Comment: 1-2 x year      Family History   Problem Relation Age of Onset    Heart Disease Father       Prior to Admission medications    Medication Sig Start Date End Date Taking? Authorizing Provider   acetaminophen (TYLENOL) 325 mg tablet Take 2 Tablets by mouth every six (6) hours. 21  Yes Miah Martinez MD   oxyCODONE IR (ROXICODONE) 5 mg immediate release tablet Take 1 Tablet by mouth every six (6) hours as needed for Pain for up to 14 days. Max Daily Amount: 20 mg. 21 Yes Miah Martinez MD   aspirin 81 mg chewable tablet Take 2 Tablets by mouth daily. 21  Yes Miah Martinez MD   acetaminophen (Tylenol Arthritis Pain) 650 mg TbER Take 650 mg by mouth every eight (8) hours. Yes Provider, Historical   levothyroxine (SYNTHROID) 125 mcg tablet Take 125 mcg by mouth Daily (before breakfast). Yes Provider, Historical   citalopram (CELEXA) 10 mg tablet Take 10 mg by mouth daily.    Yes Provider, Historical   famotidine (PEPCID) 40 mg tablet Take 40 mg by mouth daily. Yes Provider, Historical   cyanocobalamin (VITAMIN B-12) 1,000 mcg sublingual tablet 1,000 mcg by SubLINGual route daily. 20  Yes Provider, Historical   ascorbic acid, vitamin C, (Vitamin C) 500 mg tablet Take  by mouth. Yes Provider, Historical   omeprazole (PRILOSEC) 40 mg capsule Take 40 mg by mouth nightly. Provider, Historical   ergocalciferol (ERGOCALCIFEROL) 1,250 mcg (50,000 unit) capsule Vitamin D2 1,250 mcg (50,000 unit) capsule    Provider, Historical     Current Facility-Administered Medications   Medication Dose Route Frequency    0.9% sodium chloride infusion  150 mL/hr IntraVENous CONTINUOUS    vancomycin (VANCOCIN) 1,000 mg in 0.9% sodium chloride 250 mL (VIAL-MATE)  1,000 mg IntraVENous Q12H    Vancomycin- dosed by pharmacy  1 Each Other Rx Dosing/Monitoring    [Held by provider] citalopram (CELEXA) tablet 10 mg  10 mg Oral DAILY    famotidine (PEPCID) tablet 40 mg  40 mg Oral DAILY    levothyroxine (SYNTHROID) tablet 125 mcg  125 mcg Oral ACB    pantoprazole (PROTONIX) tablet 40 mg  40 mg Oral ACB    sodium chloride (NS) flush 5-40 mL  5-40 mL IntraVENous Q8H    acetaminophen (TYLENOL) tablet 650 mg  650 mg Oral Q6H    ketorolac (TORADOL) injection 15 mg  15 mg IntraVENous Q6H    enoxaparin (LOVENOX) injection 40 mg  40 mg SubCUTAneous DAILY    piperacillin-tazobactam (ZOSYN) 3.375 g in 0.9% sodium chloride (MBP/ADV) 100 mL MBP  3.375 g IntraVENous Q8H         Objective:   Vital Signs:    Visit Vitals  BP (!) 93/48   Pulse 65   Temp 98.9 °F (37.2 °C)   Resp 12   Ht 5' 5\" (1.651 m)   Wt 129.7 kg (286 lb)   SpO2 100%   BMI 47.59 kg/m²       O2 Device: None (Room air)   O2 Flow Rate (L/min): 2 l/min   Temp (24hrs), Av.3 °F (36.8 °C), Min:97.8 °F (36.6 °C), Max:98.9 °F (37.2 °C)       Intake/Output:   Last shift:      No intake/output data recorded.     Last 3 shifts:  1901 -  0700  In: 1700 [I.V.:1700]  Out: 975 [Urine:925]      Intake/Output Summary (Last 24 hours) at 7/8/2021 1106  Last data filed at 7/8/2021 0400  Gross per 24 hour   Intake 1500 ml   Output 975 ml   Net 525 ml       Last 3 Recorded Weights in this Encounter    07/07/21 0745 07/07/21 2030 07/08/21 0854   Weight: 129.9 kg (286 lb 6.4 oz) (P) 133.3 kg (293 lb 14 oz) 129.7 kg (286 lb)             Recent Labs     07/07/21  1815   PHI 7.36   PCO2I 41.4   PO2I 104*   HCO3I 23.4   FIO2I 2       Physical Exam:     General/Neurology: Alert, Awake, NAD. Obese. Head:   Normocephalic, without obvious abnormality, atraumatic. Eye:   EOM intact. No scleral icterus, no pallor, no cyanosis. Nose:   No sinus tenderness  Throat:  Lips, mucosa, and tongue normal. No oral thrush. Neck:   Supple, symmetric. No lymphadenopathy. Trachea midline  Lung: Moderate air entry bilateral equal. No rales. No rhonchi. No wheezing. No stridors. No prolongded expiration. No accessory muscle use. Heart:   Regular rate & rhythm. S1 S2 present. No murmur. No JVD. Abdomen:  Soft. NT. ND. +BS. No masses. Extremities:  No pedal edema. No cyanosis. No clubbing. Pulses: 2+ and symmetric in DP. Capillary refill: normal  Lymphatic:  No cervical or supraclavicular palpable lymphadenopathy. Musculoskeletal: Right knee under dressing.       Data:       Recent Results (from the past 24 hour(s))   GLUCOSE, POC    Collection Time: 07/07/21  5:17 PM   Result Value Ref Range    Glucose (POC) 200 (H) 70 - 110 mg/dL   EKG, 12 LEAD, INITIAL    Collection Time: 07/07/21  5:42 PM   Result Value Ref Range    Ventricular Rate 64 BPM    Atrial Rate 64 BPM    P-R Interval 170 ms    QRS Duration 84 ms    Q-T Interval 420 ms    QTC Calculation (Bezet) 433 ms    Calculated P Axis 46 degrees    Calculated R Axis 44 degrees    Calculated T Axis 17 degrees    Diagnosis       Normal sinus rhythm  Nonspecific T wave abnormality  Abnormal ECG  When compared with ECG of 21-JUN-2021 13:36,  Nonspecific T wave abnormality now evident in Inferior leads  Inverted T waves have replaced nonspecific T wave abnormality in Anterior   leads  Confirmed by Spencer Wilson MD. ([id number]) on 7/7/2021 5:48:53 PM     BLOOD GAS, ARTERIAL POC    Collection Time: 07/07/21  6:15 PM   Result Value Ref Range    Device: NASAL CANNULA      FIO2 (POC) 2 %    pH (POC) 7.36 7.35 - 7.45      pCO2 (POC) 41.4 35.0 - 45.0 MMHG    pO2 (POC) 104 (H) 80 - 100 MMHG    HCO3 (POC) 23.4 22 - 26 MMOL/L    sO2 (POC) 97.8 (H) 92 - 97 %    Base deficit (POC) 2.0 mmol/L    Allens test (POC) Positive      Site LEFT RADIAL      Specimen type (POC) ARTERIAL      Performed by Yeny Ibarra [facility])    METABOLIC PANEL, COMPREHENSIVE    Collection Time: 07/07/21  6:45 PM   Result Value Ref Range    Sodium 143 136 - 145 mmol/L    Potassium 3.5 3.5 - 5.5 mmol/L    Chloride 113 (H) 100 - 111 mmol/L    CO2 22 21 - 32 mmol/L    Anion gap 8 3.0 - 18 mmol/L    Glucose 125 (H) 74 - 99 mg/dL    BUN 12 7.0 - 18 MG/DL    Creatinine 0.58 (L) 0.6 - 1.3 MG/DL    BUN/Creatinine ratio 21 (H) 12 - 20      GFR est AA >60 >60 ml/min/1.73m2    GFR est non-AA >60 >60 ml/min/1.73m2    Calcium 6.6 (L) 8.5 - 10.1 MG/DL    Bilirubin, total 0.4 0.2 - 1.0 MG/DL    ALT (SGPT) 13 13 - 56 U/L    AST (SGOT) 13 10 - 38 U/L    Alk.  phosphatase 67 45 - 117 U/L    Protein, total 5.2 (L) 6.4 - 8.2 g/dL    Albumin 2.5 (L) 3.4 - 5.0 g/dL    Globulin 2.7 2.0 - 4.0 g/dL    A-G Ratio 0.9 0.8 - 1.7     CARDIAC PANEL,(CK, CKMB & TROPONIN)    Collection Time: 07/07/21  6:45 PM   Result Value Ref Range    CK - MB <1.0 <3.6 ng/ml    CK-MB Index  0.0 - 4.0 %     CALCULATION NOT PERFORMED WHEN RESULT IS BELOW LINEAR LIMIT    CK 94 26 - 192 U/L    Troponin-I, QT <0.02 0.0 - 0.045 NG/ML   CBC W/O DIFF    Collection Time: 07/07/21  6:45 PM   Result Value Ref Range    WBC 14.3 (H) 4.6 - 13.2 K/uL    RBC 3.49 (L) 4.20 - 5.30 M/uL    HGB 10.8 (L) 12.0 - 16.0 g/dL    HCT 32.9 (L) 35.0 - 45.0 %    MCV 94.3 74.0 - 97.0 FL    MCH 30.9 24.0 - 34.0 PG    MCHC 32.8 31.0 - 37.0 g/dL    RDW 15.9 (H) 11.6 - 14.5 %    PLATELET 761 625 - 418 K/uL    MPV 8.8 (L) 9.2 - 11.8 FL   MAGNESIUM    Collection Time: 07/07/21  6:45 PM   Result Value Ref Range    Magnesium 1.7 1.6 - 2.6 mg/dL   T4, FREE    Collection Time: 07/07/21  6:45 PM   Result Value Ref Range    T4, Free 1.0 0.7 - 1.5 NG/DL   TSH 3RD GENERATION    Collection Time: 07/07/21  6:45 PM   Result Value Ref Range    TSH 1.99 0.36 - 3.74 uIU/mL   CULTURE, BLOOD    Collection Time: 07/07/21 10:45 PM    Specimen: Blood   Result Value Ref Range    Special Requests: NO SPECIAL REQUESTS      Culture result: NO GROWTH AFTER 8 HOURS     METABOLIC PANEL, COMPREHENSIVE    Collection Time: 07/07/21 10:45 PM   Result Value Ref Range    Sodium 140 136 - 145 mmol/L    Potassium 3.8 3.5 - 5.5 mmol/L    Chloride 107 100 - 111 mmol/L    CO2 27 21 - 32 mmol/L    Anion gap 6 3.0 - 18 mmol/L    Glucose 143 (H) 74 - 99 mg/dL    BUN 12 7.0 - 18 MG/DL    Creatinine 0.96 0.6 - 1.3 MG/DL    BUN/Creatinine ratio 13 12 - 20      GFR est AA >60 >60 ml/min/1.73m2    GFR est non-AA >60 >60 ml/min/1.73m2    Calcium 8.3 (L) 8.5 - 10.1 MG/DL    Bilirubin, total 0.6 0.2 - 1.0 MG/DL    ALT (SGPT) 16 13 - 56 U/L    AST (SGOT) 12 10 - 38 U/L    Alk.  phosphatase 82 45 - 117 U/L    Protein, total 6.4 6.4 - 8.2 g/dL    Albumin 3.1 (L) 3.4 - 5.0 g/dL    Globulin 3.3 2.0 - 4.0 g/dL    A-G Ratio 0.9 0.8 - 1.7     PROTHROMBIN TIME + INR    Collection Time: 07/07/21 10:45 PM   Result Value Ref Range    Prothrombin time 15.8 (H) 11.5 - 15.2 sec    INR 1.3 (H) 0.8 - 1.2     D DIMER    Collection Time: 07/07/21 10:45 PM   Result Value Ref Range    D DIMER 3.62 (H) <0.46 ug/ml(FEU)   LACTIC ACID    Collection Time: 07/07/21 10:45 PM   Result Value Ref Range    Lactic acid 2.5 (HH) 0.4 - 2.0 MMOL/L   METABOLIC PANEL, COMPREHENSIVE    Collection Time: 07/08/21  5:02 AM   Result Value Ref Range    Sodium 141 136 - 145 mmol/L    Potassium 3.9 3.5 - 5.5 mmol/L    Chloride 109 100 - 111 mmol/L    CO2 27 21 - 32 mmol/L    Anion gap 5 3.0 - 18 mmol/L    Glucose 120 (H) 74 - 99 mg/dL    BUN 11 7.0 - 18 MG/DL    Creatinine 0.90 0.6 - 1.3 MG/DL    BUN/Creatinine ratio 12 12 - 20      GFR est AA >60 >60 ml/min/1.73m2    GFR est non-AA >60 >60 ml/min/1.73m2    Calcium 8.0 (L) 8.5 - 10.1 MG/DL    Bilirubin, total 0.7 0.2 - 1.0 MG/DL    ALT (SGPT) 13 13 - 56 U/L    AST (SGOT) 9 (L) 10 - 38 U/L    Alk. phosphatase 70 45 - 117 U/L    Protein, total 5.7 (L) 6.4 - 8.2 g/dL    Albumin 2.8 (L) 3.4 - 5.0 g/dL    Globulin 2.9 2.0 - 4.0 g/dL    A-G Ratio 1.0 0.8 - 1.7     LACTIC ACID    Collection Time: 07/08/21  5:02 AM   Result Value Ref Range    Lactic acid 1.9 0.4 - 2.0 MMOL/L   CBC WITH AUTOMATED DIFF    Collection Time: 07/08/21  5:02 AM   Result Value Ref Range    WBC 12.6 4.6 - 13.2 K/uL    RBC 3.36 (L) 4.20 - 5.30 M/uL    HGB 10.2 (L) 12.0 - 16.0 g/dL    HCT 31.5 (L) 35.0 - 45.0 %    MCV 93.8 74.0 - 97.0 FL    MCH 30.4 24.0 - 34.0 PG    MCHC 32.4 31.0 - 37.0 g/dL    RDW 15.8 (H) 11.6 - 14.5 %    PLATELET 902 044 - 943 K/uL    MPV 9.3 9.2 - 11.8 FL    NEUTROPHILS 77 (H) 40 - 73 %    LYMPHOCYTES 15 (L) 21 - 52 %    MONOCYTES 8 3 - 10 %    EOSINOPHILS 0 0 - 5 %    BASOPHILS 0 0 - 2 %    ABS. NEUTROPHILS 9.6 (H) 1.8 - 8.0 K/UL    ABS. LYMPHOCYTES 1.9 0.9 - 3.6 K/UL    ABS. MONOCYTES 1.0 0.05 - 1.2 K/UL    ABS. EOSINOPHILS 0.0 0.0 - 0.4 K/UL    ABS.  BASOPHILS 0.0 0.0 - 0.1 K/UL    DF AUTOMATED     ECHO ADULT COMPLETE    Collection Time: 07/08/21  8:54 AM   Result Value Ref Range    IVSd 1.35 (A) 0.60 - 0.90 cm    LVIDd 4.82 3.90 - 5.30 cm    LVIDs 3.13 cm    LVOT d 2.15 cm    LVPWd 1.08 (A) 0.60 - 0.90 cm    BP EF 54.2 (A) 55.0 - 100.0 %    LV Ejection Fraction MOD 2C 52 %    LV Ejection Fraction MOD 4C 58 %    LV ED Vol A2C 66.62 ml    LV ED Vol A4C 107.47 ml    LV ED Vol BP 86.11 56.0 - 104.0 ml    LV ES Vol A2C 31.70 ml    LV ES Vol A4C 44.60 ml    LV ES Vol BP 39.45 19.0 - 49.0 ml    LVOT SV 84.7 ml    LVOT Cardiac Output 14.67 l/min    LVOT Peak Gradient 4.65 mmHg    Left Ventricular Outflow Tract Mean Gradient 2.18 mmHg    LVOT Peak Velocity 108.00 cm/s    LVOT VTI 23.25 cm    RVIDd 3.69 cm    LA Volume 38.23 22.0 - 52.0 ml    LA Vol 2C 27.52 22.00 - 52.00 ml    LA Vol 4C 49.07 22.00 - 52.00 ml    Right Atrial Area 4C 21.44 cm2    AV Annulus 2.56 cm    Aortic Valve Area by Continuity of Peak Velocity 2.32 cm2    Aortic Valve Area by Continuity of VTI 2.19 cm2    AoV PG 11.52 mmHg    Aortic Valve Systolic Mean Gradient 4.24 mmHg    Aortic Valve Systolic Peak Velocity 805.68 cm/s    AoV VTI 38.66 cm    MV A Danie 71.00 cm/s    Mitral Valve E Wave Deceleration Time 282.58 ms    MV E Danie 66.00 cm/s    Mitral Valve Pressure Half-time 81.95 ms    MVA (PHT) 2.68 cm2    LV E' Septal Velocity 9.00 cm/s    LV E' Lateral Velocity 11.00 cm/s    MV E/A 0.93     LV Mass .5 67.0 - 162.0 g    LV Mass AL Index 97.6 43.0 - 95.0 g/m2    E/E' lateral 6.00     E/E' septal 7.33     IVC proximal 1.40 cm    E/E' ratio (averaged) 6.67     Tapse 3.20 1.50 - 2.00 cm    KEVIN/BSA Pk Danie 1.0 cm2/m2    KEVIN/BSA VTI 1.0 cm2/m2         Chemistry Recent Labs     07/08/21  0502 07/07/21  2245 07/07/21  1845   * 143* 125*    140 143   K 3.9 3.8 3.5    107 113*   CO2 27 27 22   BUN 11 12 12   CREA 0.90 0.96 0.58*   CA 8.0* 8.3* 6.6*   MG  --   --  1.7   AGAP 5 6 8   BUCR 12 13 21*   AP 70 82 67   TP 5.7* 6.4 5.2*   ALB 2.8* 3.1* 2.5*   GLOB 2.9 3.3 2.7   AGRAT 1.0 0.9 0.9        Lactic Acid Lactic acid   Date Value Ref Range Status   07/08/2021 1.9 0.4 - 2.0 MMOL/L Final     Recent Labs     07/08/21  0502 07/07/21  2245   LAC 1.9 2.5*        Liver Enzymes Protein, total   Date Value Ref Range Status   07/08/2021 5.7 (L) 6.4 - 8.2 g/dL Final     Albumin   Date Value Ref Range Status   07/08/2021 2.8 (L) 3.4 - 5.0 g/dL Final     Globulin   Date Value Ref Range Status   07/08/2021 2.9 2.0 - 4.0 g/dL Final     A-G Ratio   Date Value Ref Range Status   07/08/2021 1.0 0.8 - 1.7   Final     Alk. phosphatase   Date Value Ref Range Status   07/08/2021 70 45 - 117 U/L Final     Recent Labs     07/08/21  0502 07/07/21 2245 07/07/21 1845   TP 5.7* 6.4 5.2*   ALB 2.8* 3.1* 2.5*   GLOB 2.9 3.3 2.7   AGRAT 1.0 0.9 0.9   AP 70 82 67        CBC w/Diff Recent Labs     07/08/21  0502 07/07/21 1845   WBC 12.6 14.3*   RBC 3.36* 3.49*   HGB 10.2* 10.8*   HCT 31.5* 32.9*    301   GRANS 77*  --    LYMPH 15*  --    EOS 0  --         Cardiac Enzymes Lab Results   Component Value Date/Time    CPK 94 07/07/2021 06:45 PM    CKMB <1.0 07/07/2021 06:45 PM    CKND1  07/07/2021 06:45 PM     CALCULATION NOT PERFORMED WHEN RESULT IS BELOW LINEAR LIMIT    TROIQ <0.02 07/07/2021 06:45 PM        BNP No results found for: BNP, BNPP, XBNPT     Coagulation Recent Labs     07/07/21 2245   PTP 15.8*   INR 1.3*         Thyroid  Lab Results   Component Value Date/Time    TSH 1.99 07/07/2021 06:45 PM       No results found for: T4     Urinalysis No results found for: COLOR, APPRN, SPGRU, REFSG, MARQUES, PROTU, GLUCU, KETU, BILU, UROU, MAURISIO, LEUKU, GLUKE, EPSU, BACTU, WBCU, RBCU, CASTS, UCRY     Micro  Recent Labs     07/07/21 2245   CULT NO GROWTH AFTER 8 HOURS     Recent Labs     07/07/21 2245   CULT NO GROWTH AFTER 8 HOURS          Culture data during this hospitalization. All Micro Results     Procedure Component Value Units Date/Time    CULTURE, BLOOD [[id number]] Collected: 07/07/21 2245    Order Status: Completed Specimen: Blood Updated: 07/08/21 0711     Special Requests: NO SPECIAL REQUESTS        Culture result: NO GROWTH AFTER 8 HOURS       CULTURE, URINE [[id number]]     Order Status: Sent Specimen: Urine from Clean catch            CT abdomen/pelvis 7/8/2021:  No acute intra-abdominal process. CT head 7/8/2021:  No acute intracranial abnormalities. CTA chest 7/8/2021:  1. No evidence of pulmonary embolism.   2.  No active cardiopulmonary disease. 3. 1.6 cm low-density left thyroid nodule. Recommend ultrasound follow-up. (Discussed with radiologist and confirmed the size of 1.6 cm). PVL LE 7/7/2021:  · No evidence of deep vein thrombosis in the right lower extremity. · No evidence of deep vein thrombosis in the left lower extremity. Limited visualization of right lower extremity due to extensive bandages from knee surgery. ·Please note: Voice-recognition software may have been used to generate this report, which may have resulted in some phonetic-based errors in grammar and contents. Even though attempts were made to correct all the mistakes, some may have been missed, and remained in the body of the document.       Aida Campo MD  7/8/2021

## 2021-07-08 NOTE — PROGRESS NOTES
Progress Note  Date:2021       Room:[facility]  Patient Name:Queenie Villanueva     YOB: 1975     Age:45 y.o. Subjective    Subjective POD1 Feels good  Review of SystemsNeg  Objective         Vitals Last 24 Hours:  TEMPERATURE:  Temp  Av.3 °F (36.8 °C)  Min: 97.8 °F (36.6 °C)  Max: 98.9 °F (37.2 °C)  RESPIRATIONS RANGE: Resp  Avg: 15.8  Min: 10  Max: 25  PULSE OXIMETRY RANGE: SpO2  Av.1 %  Min: 92 %  Max: 100 %  PULSE RANGE: Pulse  Av.2  Min: 54  Max: 88  BLOOD PRESSURE RANGE: Systolic (24SZN), PHR:122 , Min:89 , PRD:684   ; Diastolic (03ZPZ), [place]:67, Min:46, Max:85      I/O (24Hr): Intake/Output Summary (Last 24 hours) at 2021 1000  Last data filed at 2021 0400  Gross per 24 hour   Intake 1700 ml   Output 975 ml   Net 725 ml     ObjectiveRight knee dressing clean, dry; N/V intact  Labs/Imaging/Diagnostics    Labs:  CBC:  Recent Labs     21   WBC 12.6 14.3*   RBC 3.36* 3.49*   HGB 10.2* 10.8*   HCT 31.5* 32.9*   MCV 93.8 94.3   RDW 15.8* 15.9*    301     CHEMISTRIES:  Recent Labs     21  05021  1845    140 143   K 3.9 3.8 3.5    107 113*   CO2 27 27 22   BUN 11 12 12   CREA 0.90 0.96 0.58*   CA 8.0* 8.3* 6.6*   MG  --   --  1.7   PT/INR:  Recent Labs     21   INR 1.3*     APTT:No results for input(s): APTT in the last 72 hours. LIVER PROFILE:  Recent Labs     21  05021  1845   AST 9* 12 13   ALT 13 16 13     Lab Results   Component Value Date/Time    ALT (SGPT) 13 2021 05:02 AM    AST (SGOT) 9 (L) 2021 05:02 AM    Alk. phosphatase 70 2021 05:02 AM    Bilirubin, total 0.7 2021 05:02 AM       Imaging Last 24 Hours:  CT HEAD WO CONT    Result Date: 2021  EXAM: CT head INDICATION: Altered mental status. COMPARISON: None.  TECHNIQUE: Axial CT imaging of the head was performed without intravenous contrast. Dose reduction techniques used: automated exposure control, adjustment of the mAs and/or kVp according to patient size, and iterative reconstruction techniques. Digital imaging and communications in Medicine (DICOM) format image data are available to nonaffiliated external healthcare facilities or entities on a secure, media free, reciprocally searchable basis with patient authorization for at least 12 months after this study. _______________ FINDINGS: BRAIN AND POSTERIOR FOSSA: The sulci, folia, ventricles and basal cisterns are within normal limits for the patient's age. There is no intracranial hemorrhage, mass effect, or midline shift. There are no areas of abnormal parenchymal attenuation. EXTRA-AXIAL SPACES AND MENINGES: There are no abnormal extra-axial fluid collections. CALVARIUM: Intact. SINUSES: Clear. OTHER: None. _______________     No acute intracranial abnormalities. CTA CHEST W OR W WO CONT    Result Date: 7/8/2021  EXAM: CTA chest INDICATION: Pain. COMPARISON: November 15, 2020. TECHNIQUE: Axial CT imaging from the thoracic inlet through the diaphragm with intravenous contrast. Coronal and sagittal MIP reformats were generated. Dose reduction techniques used: automated exposure control, adjustment of the mAs and/or kVp according to patient size, and iterative reconstruction techniques. Digital imaging and communications in Medicine (DICOM) format image data are available to nonaffiliated external healthcare facilities or entities on a secure, media free, reciprocally searchable basis with patient authorization for at least 12 months after this study. _______________ FINDINGS: EXAM QUALITY: Adequate. PULMONARY ARTERIES: No evidence of pulmonary embolism. MEDIASTINUM: Normal heart size. No evidence of right heart strain. Aorta is unremarkable. No pericardial effusion. LUNGS: There is mild scarring at the left lung base. No suspicious nodule or mass. No abnormal opacities.  PLEURA: Normal. AIRWAY: Normal. LYMPH NODES: No enlarged nodes. UPPER ABDOMEN: Unremarkable. OTHER: No acute or aggressive osseous abnormalities identified. There is a 1.6 cm low-density left thyroid nodule. _______________     1. No evidence of pulmonary embolism. 2.  No active cardiopulmonary disease. 3. 1.3 cm low-density left thyroid nodule. Recommend ultrasound follow-up. CT ABD PELV W CONT    Result Date: 7/8/2021  EXAM: CT of the abdomen and pelvis INDICATION: Pain. COMPARISON: None. TECHNIQUE: Axial CT imaging of the abdomen and pelvis was performed with intravenous contrast. Multiplanar reformats were generated. Dose reduction techniques used: automated exposure control, adjustment of the mAs and/or kVp according to patient size, and iterative reconstruction techniques. Digital imaging and communications in Medicine (DICOM) format image data are available to nonaffiliated external healthcare facilities or entities on a secure, media free, reciprocally searchable basis with patient authorization for at least 12 months after this study. _______________ FINDINGS: LOWER CHEST: There is minimal scarring at the left lung base. LIVER, BILIARY: Liver is normal. No biliary dilation. Gallbladder is unremarkable. PANCREAS: Normal. SPLEEN: Normal. ADRENALS: Normal. KIDNEYS: Normal. LYMPH NODES: No enlarged lymph nodes. GASTROINTESTINAL TRACT: There has been a prior gastric sleeve procedure. PELVIC ORGANS: Unremarkable. VASCULATURE: Unremarkable. BONES: No acute or aggressive osseous abnormalities identified. OTHER: None. _______________     No acute intra-abdominal process. XR CHEST PORT    Result Date: 7/7/2021  EXAM:  AP Portable Chest X-ray 1 view INDICATION: Hypoxia COMPARISON: November 15, 2020 _______________ FINDINGS:  Heart and mediastinal contours are within normal limits for portable radiograph. Lungs are clear of active disease. There are no pleural effusions. No acute osseous findings.  ________________      No acute process    DUPLEX LOWER EXT VENOUS BILAT    Result Date: 7/8/2021  · No evidence of deep vein thrombosis in the right lower extremity. · No evidence of deep vein thrombosis in the left lower extremity. Limited visualization of right lower extremity due to extensive bandages from knee surgery. Assessment//Plan           Patient Active Problem List    Diagnosis Date Noted    DJD (degenerative joint disease) of knee 07/07/2021    Unresponsiveness 07/07/2021    Hypothyroid     Intestinal malabsorption     Anxiety     GERD (gastroesophageal reflux disease)     Athyroidism (acquired)     Morbid obesity ([facility] [address]     Morbid obesity with body mass index (BMI) of 40.0 to 49.9 ([facility] [address]     Smoking history     Status post gastric bypass for obesity 2005     Assessment & Plan Suspect vagal episode in bathroom after PT. Neg workup. Continue PT. Discharge home today if possible.  Scripts written        Electronically signed by Dina Landeros MD on 7/8/2021 at 10:00 AM

## 2021-07-08 NOTE — PROGRESS NOTES
Problem: Falls - Risk of  Goal: *Absence of Falls  Description: Document Khushbu Chatterjee Fall Risk and appropriate interventions in the flowsheet.   Outcome: Progressing Towards Goal  Note: Fall Risk Interventions:  Mobility Interventions: Bed/chair exit alarm, Communicate number of staff needed for ambulation/transfer, Patient to call before getting OOB, Assess mobility with egress test         Medication Interventions: Evaluate medications/consider consulting pharmacy, Bed/chair exit alarm, Patient to call before getting OOB, Teach patient to arise slowly    Elimination Interventions: Bed/chair exit alarm, Call light in reach, Patient to call for help with toileting needs, Toileting schedule/hourly rounds, Toilet paper/wipes in reach              Problem: Patient Education: Go to Patient Education Activity  Goal: Patient/Family Education  Outcome: Progressing Towards Goal     Problem: Patient Education: Go to Patient Education Activity  Goal: Patient/Family Education  Outcome: Progressing Towards Goal     Problem: Patient Education: Go to Patient Education Activity  Goal: Patient/Family Education  Outcome: Progressing Towards Goal

## 2021-07-08 NOTE — ROUTINE PROCESS
TRANSFER - OUT REPORT:    Verbal report given to Jonelle Jimenez RN (name) on Elsa Mascorro  being transferred to ICU (unit) for change in patient condition(RRT for unresponsiveness)       Report consisted of patients Situation, Background, Assessment and   Recommendations(SBAR). Information from the following report(s) SBAR, Kardex, OR Summary, Intake/Output, MAR and Recent Results was reviewed with the receiving nurse. Lines:   Peripheral IV 07/07/21 Posterior;Right Hand (Active)   Site Assessment Clean, dry, & intact 07/07/21 1318   Phlebitis Assessment 0 07/07/21 1318   Infiltration Assessment 0 07/07/21 1318   Dressing Status Clean, dry, & intact 07/07/21 1318   Dressing Type Tape;Transparent 07/07/21 1318   Hub Color/Line Status Pink; Infusing 07/07/21 1318   Alcohol Cap Used No 07/07/21 1318       Peripheral IV 07/07/21 Right;Proximal Basilic (Active)   Site Assessment Clean, dry, & intact 07/07/21 1957   Phlebitis Assessment 0 07/07/21 1957   Infiltration Assessment 0 07/07/21 1957   Dressing Status Clean, dry, & intact 07/07/21 1957   Dressing Type Disk with Chlorhexadine gluconate (CHG); Transparent 07/07/21 1957   Hub Color/Line Status Green 07/07/21 1957   Action Taken Other (comment) 07/07/21 1957   Alcohol Cap Used Yes 07/07/21 1957        Opportunity for questions and clarification was provided. Patient transported with chart and belongings.

## 2021-07-08 NOTE — PROGRESS NOTES
Problem: Mobility Impaired (Adult and Pediatric)  Goal: *Acute Goals and Plan of Care (Insert Text)  Description: In 1-7 days pt will be able to perform:  ST.  Bed mobility:  Rolling L to R to L modified independent for positioning. 2.  Supine to sit to supine S with HR for meals. 3.  Sit to stand to sit S with RW in prep for ambulation. LT.  Gait:  Ambulate >150ft S with RW, WBAT, for home/community mobility. 2.  Stair Negotiation:  Ascend/descend >10 steps CGA with HR for home entry. 3.  Activity tolerance: Tolerate up in chair 1-2 hours for ADL's.  4.  Patient/Family Education:  Patient/family to be independent with HEP for follow-up care and safe discharge. Outcome: Not Progressing Towards Goal   []  Patient has met MD mobilization critieria for d/c home   []  Recommend HH with 24 hour adult care   [x]  Benefit from additional acute PT session to address:  all aspects of mobility    PHYSICAL THERAPY TREATMENT    Patient: Bud Robb (78 y.o. female)  Date: 2021  Diagnosis: DJD (degenerative joint disease) of knee [M17.10] <principal problem not specified>  Procedure(s) (LRB):  RIGHT TOTAL KNEE ARTHROPLASTY WITH CONFORMIS  **SPEC POP** (Right) 1 Day Post-Op  Precautions: Fall, WBAT  PLOF:     ASSESSMENT:  Upon arriving to ICU unit, pt nurse stated the pt is in the chair and is dizzy. Upon entering pt room, pt BP 56/35, laying head down on table, very diaphoretic and loosing consciousness. CNA follow into room, moved bed and TotalAx3 back to supine. BP increased to  94/57 once supine. Progression toward goals:   []      Improving appropriately and progressing toward goals  []      Improving slowly and progressing toward goals  [x]      Not making progress toward goals and plan of care will be adjusted     PLAN:  Patient continues to benefit from skilled intervention to address the above impairments. Continue treatment per established plan of care.   Discharge Recommendations: To Be Determined  Further Equipment Recommendations for Discharge:  rolling walker     SUBJECTIVE:   Patient stated I need to lay down.     OBJECTIVE DATA SUMMARY:   Critical Behavior:  Neurologic State: Alert  Orientation Level: Oriented X4  Cognition: Follows commands  Safety/Judgement: Awareness of environment  Functional Mobility Training:  Bed Mobility:  Sit to Supine: Total assistance  Scooting: Total assistance  Transfers:  Sit to Stand: Total assistance  Stand to Sit: Total assistance  Balance:  Sitting: Impaired         Pain:  Pain level pre-treatment: 0/10  Pain level post-treatment: 0/10   Pain Intervention(s): Medication (see MAR); Rest, Ice, Repositioning   Response to intervention: Nurse notified, See doc flow    Activity Tolerance:   Poor/none  Please refer to the flowsheet for vital signs taken during this treatment. After treatment:   [] Patient left in no apparent distress sitting up in chair  [x] Patient left in no apparent distress in bed  [] Call bell left within reach  [x] Nursing present  [] Caregiver present  [] Bed alarm activated  [] SCDs applied      COMMUNICATION/EDUCATION:   []         Role of Physical Therapy in the acute care setting. []         Fall prevention education was provided and the patient/caregiver indicated understanding. []         Patient/family have participated as able in working toward goals and plan of care. []         Patient/family agree to work toward stated goals and plan of care. []         Patient understands intent and goals of therapy, but is neutral about his/her participation. []         Patient is unable to participate in stated goals/plan of care: ongoing with therapy staff.   [x]         Other:        Cara Shaikh PTA   Time Calculation: 15 mins

## 2021-07-09 PROBLEM — R55 SYNCOPE: Status: ACTIVE | Noted: 2021-07-09

## 2021-07-09 LAB
ALBUMIN SERPL-MCNC: 3.3 G/DL (ref 3.4–5)
ALBUMIN/GLOB SERPL: 1.5 {RATIO} (ref 0.8–1.7)
ALP SERPL-CCNC: 56 U/L (ref 45–117)
ALT SERPL-CCNC: 13 U/L (ref 13–56)
ANION GAP SERPL CALC-SCNC: 4 MMOL/L (ref 3–18)
AST SERPL-CCNC: 9 U/L (ref 10–38)
BILIRUB SERPL-MCNC: 0.6 MG/DL (ref 0.2–1)
BUN SERPL-MCNC: 9 MG/DL (ref 7–18)
BUN/CREAT SERPL: 15 (ref 12–20)
CALCIUM SERPL-MCNC: 7.8 MG/DL (ref 8.5–10.1)
CHLORIDE SERPL-SCNC: 116 MMOL/L (ref 100–111)
CO2 SERPL-SCNC: 25 MMOL/L (ref 21–32)
CREAT SERPL-MCNC: 0.62 MG/DL (ref 0.6–1.3)
GLOBULIN SER CALC-MCNC: 2.2 G/DL (ref 2–4)
GLUCOSE SERPL-MCNC: 83 MG/DL (ref 74–99)
POTASSIUM SERPL-SCNC: 3.5 MMOL/L (ref 3.5–5.5)
PROT SERPL-MCNC: 5.5 G/DL (ref 6.4–8.2)
SODIUM SERPL-SCNC: 145 MMOL/L (ref 136–145)

## 2021-07-09 PROCEDURE — 65660000000 HC RM CCU STEPDOWN

## 2021-07-09 PROCEDURE — P9047 ALBUMIN (HUMAN), 25%, 50ML: HCPCS | Performed by: HOSPITALIST

## 2021-07-09 PROCEDURE — 74011250636 HC RX REV CODE- 250/636: Performed by: FAMILY MEDICINE

## 2021-07-09 PROCEDURE — 74011250637 HC RX REV CODE- 250/637: Performed by: ORTHOPAEDIC SURGERY

## 2021-07-09 PROCEDURE — 74011000258 HC RX REV CODE- 258: Performed by: HOSPITALIST

## 2021-07-09 PROCEDURE — 93005 ELECTROCARDIOGRAM TRACING: CPT

## 2021-07-09 PROCEDURE — 97116 GAIT TRAINING THERAPY: CPT

## 2021-07-09 PROCEDURE — 74011250636 HC RX REV CODE- 250/636: Performed by: HOSPITALIST

## 2021-07-09 PROCEDURE — 74011250636 HC RX REV CODE- 250/636: Performed by: ORTHOPAEDIC SURGERY

## 2021-07-09 PROCEDURE — 36415 COLL VENOUS BLD VENIPUNCTURE: CPT

## 2021-07-09 PROCEDURE — 80053 COMPREHEN METABOLIC PANEL: CPT

## 2021-07-09 PROCEDURE — 97530 THERAPEUTIC ACTIVITIES: CPT

## 2021-07-09 RX ORDER — KETOROLAC TROMETHAMINE 15 MG/ML
15 INJECTION, SOLUTION INTRAMUSCULAR; INTRAVENOUS ONCE
Status: COMPLETED | OUTPATIENT
Start: 2021-07-09 | End: 2021-07-09

## 2021-07-09 RX ADMIN — ACETAMINOPHEN 650 MG: 325 TABLET, FILM COATED ORAL at 17:58

## 2021-07-09 RX ADMIN — VANCOMYCIN HYDROCHLORIDE 1000 MG: 1 INJECTION, POWDER, LYOPHILIZED, FOR SOLUTION INTRAVENOUS at 09:32

## 2021-07-09 RX ADMIN — PIPERACILLIN AND TAZOBACTAM 3.38 G: 3; .375 INJECTION, POWDER, LYOPHILIZED, FOR SOLUTION INTRAVENOUS at 20:24

## 2021-07-09 RX ADMIN — ALBUMIN (HUMAN) 25 G: 0.25 INJECTION, SOLUTION INTRAVENOUS at 17:58

## 2021-07-09 RX ADMIN — PANTOPRAZOLE SODIUM 40 MG: 40 TABLET, DELAYED RELEASE ORAL at 09:32

## 2021-07-09 RX ADMIN — PIPERACILLIN AND TAZOBACTAM 3.38 G: 3; .375 INJECTION, POWDER, LYOPHILIZED, FOR SOLUTION INTRAVENOUS at 03:11

## 2021-07-09 RX ADMIN — FAMOTIDINE 40 MG: 20 TABLET, FILM COATED ORAL at 09:32

## 2021-07-09 RX ADMIN — SODIUM CHLORIDE 150 ML/HR: 900 INJECTION, SOLUTION INTRAVENOUS at 13:01

## 2021-07-09 RX ADMIN — ENOXAPARIN SODIUM 40 MG: 40 INJECTION SUBCUTANEOUS at 00:30

## 2021-07-09 RX ADMIN — ALBUMIN (HUMAN) 25 G: 0.25 INJECTION, SOLUTION INTRAVENOUS at 13:00

## 2021-07-09 RX ADMIN — ACETAMINOPHEN 650 MG: 325 TABLET, FILM COATED ORAL at 05:39

## 2021-07-09 RX ADMIN — PIPERACILLIN AND TAZOBACTAM 3.38 G: 3; .375 INJECTION, POWDER, LYOPHILIZED, FOR SOLUTION INTRAVENOUS at 13:01

## 2021-07-09 RX ADMIN — ACETAMINOPHEN 650 MG: 325 TABLET, FILM COATED ORAL at 13:00

## 2021-07-09 RX ADMIN — ALBUMIN (HUMAN) 25 G: 0.25 INJECTION, SOLUTION INTRAVENOUS at 05:40

## 2021-07-09 RX ADMIN — KETOROLAC TROMETHAMINE 15 MG: 15 INJECTION, SOLUTION INTRAMUSCULAR; INTRAVENOUS at 14:59

## 2021-07-09 RX ADMIN — LEVOTHYROXINE SODIUM 125 MCG: 0.03 TABLET ORAL at 09:32

## 2021-07-09 NOTE — ROUTINE PROCESS
TRANSFER - IN REPORT:    Verbal report received from Leatha Whitlock Rn(name) on Bud Robb  being received from ICU(unit) for routine progression of care      Report consisted of patients Situation, Background, Assessment and   Recommendations(SBAR). Information from the following report(s) SBAR and Kardex was reviewed with the receiving nurse. Opportunity for questions and clarification was provided. Assessment completed upon patients arrival to unit and care assumed.

## 2021-07-09 NOTE — PROGRESS NOTES
0700 Bedside and Verbal shift change report given to DAVID Davila (oncoming nurse) by Ernie Grande RN (offgoing nurse). Report included the following information SBAR, Kardex, Intake/Output, Recent Results and Cardiac Rhythm NSR.     0800 Shift assessment completed, see flowsheet. AOx4. No c/o pain or SOB at this time. Right knee dsg CDI at this time. 1000 Per Dr. Klely Morel and Dr. Chon Stanley patient may transfer to Telemetry. 1030 Orthostatic BP's (lying then sitting) obtained at this time, remained WNL. 1200 Reassessment, no changes. 1300 Ambulating with PT. No significant drop in BP upon standing, 115/77 (82). Tolerating well. 1430 Patient c/o right knee pain 8/10. Requesting Tylenol arthritis. Educated that she just received Tylenol. Oxycodone offered, declined because she \"does not like the way it makes me feel. \" Asking for Toradol. Dr. Chon Stanley paged and will place orders. 1450 Receiving nurse aware of new order placed for Toradol and will administer upon assessment. All belongings transported with patient to include cell phone and purse. TRANSFER - OUT REPORT:    Verbal report given to [place], Rn(name) on Dharmesh Ortiz  being transferred to Telemetry(unit) for routine progression of care       Report consisted of patients Situation, Background, Assessment and   Recommendations(SBAR). Information from the following report(s) SBAR, Kardex, Intake/Output, Recent Results and Cardiac Rhythm NSR was reviewed with the receiving nurse.     Lines:   Peripheral IV 07/07/21 Posterior;Right Hand (Active)   Site Assessment Clean, dry, & intact 07/09/21 1200   Phlebitis Assessment 0 07/09/21 1200   Infiltration Assessment 0 07/09/21 1200   Dressing Status Clean, dry, & intact 07/09/21 1200   Dressing Type Transparent;Tape 07/09/21 1200   Hub Color/Line Status Infusing 07/09/21 1200   Action Taken Open ports on tubing capped 07/09/21 1200   Alcohol Cap Used Yes 07/09/21 1200       Peripheral IV 07/08/21 Anterior;Right Forearm (Active)   Site Assessment Clean, dry, & intact 07/09/21 1200   Phlebitis Assessment 0 07/09/21 1200   Infiltration Assessment 0 07/09/21 1200   Dressing Status Clean, dry, & intact 07/09/21 1200   Dressing Type Transparent;Tape 07/09/21 1200   Hub Color/Line Status Infusing 07/09/21 1200   Action Taken Open ports on tubing capped 07/09/21 1200   Alcohol Cap Used Yes 07/09/21 1200        Opportunity for questions and clarification was provided.       Patient transported with:   Monitor  Registered Nurse

## 2021-07-09 NOTE — PROGRESS NOTES
Problem: Mobility Impaired (Adult and Pediatric)  Goal: *Acute Goals and Plan of Care (Insert Text)  Description: In 1-7 days pt will be able to perform:  ST.  Bed mobility:  Rolling L to R to L modified independent for positioning. 2.  Supine to sit to supine S with HR for meals. 3.  Sit to stand to sit S with RW in prep for ambulation. LT.  Gait:  Ambulate >150ft S with RW, WBAT, for home/community mobility. 2.  Stair Negotiation:  Ascend/descend >10 steps CGA with HR for home entry. 3.  Activity tolerance: Tolerate up in chair 1-2 hours for ADL's.  4.  Patient/Family Education:  Patient/family to be independent with HEP for follow-up care and safe discharge. Outcome: Progressing Towards Goal   []  Patient has met MD mobilization crijaciel for d/c home   []  Recommend HH with 24 hour adult care   [x]  Benefit from additional acute PT session to address:  all aspects of mobility    PHYSICAL THERAPY TREATMENT    Patient: Rosalia Keane (2 y.o. female)  Date: 2021  Diagnosis: DJD (degenerative joint disease) of knee [M17.10] Status post total right knee replacement  Procedure(s) (LRB):  RIGHT TOTAL KNEE ARTHROPLASTY WITH CONFORMIS  **SPEC POP** (Right) 2 Days Post-Op  Precautions: Fall, WBAT  PLOF:     ASSESSMENT:  Orthostatics: supine at rest 110/73, seated /57, standing 115/77, s/p ambulation 128/74. Pt utilized UEs to assist RLE out of bed. Sat EOB for extended amount of time to acclimate, no c/o lightheadedness or dizziness. Ambulated around the bed and back with RW for a total of 30ft, step to gt pattern, decreased pace, increased time in between each step, c/o heaviness in the RLE. Pt returned to supine in bed. Pt transferring to tele floor today.   Progression toward goals:   []      Improving appropriately and progressing toward goals  [x]      Improving slowly and progressing toward goals  []      Not making progress toward goals and plan of care will be adjusted PLAN:  Patient continues to benefit from skilled intervention to address the above impairments. Continue treatment per established plan of care. Discharge Recommendations:  [address]  Further Equipment Recommendations for Discharge:  rolling walker     SUBJECTIVE:   Patient stated Better then yesterday.     OBJECTIVE DATA SUMMARY:   Critical Behavior:  Neurologic State: Alert  Orientation Level: Oriented X4  Cognition: Appropriate decision making, Appropriate for age attention/concentration, Appropriate safety awareness, Follows commands  Safety/Judgement: Fall prevention  Functional Mobility Training:  Bed Mobility:    Supine to Sit: Modified independent; Additional time  Sit to Supine: Contact guard assistance; Additional time  Scooting: Minimum assistance  Transfers:  Sit to Stand: Contact guard assistance; Additional time  Stand to Sit: Contact guard assistance  Balance:  Sitting: Intact  Standing: Intact; With support  Standing - Static: Fair  Standing - Dynamic : Fair   Ambulation/Gait Training:  Distance (ft): 30 Feet (ft)  Assistive Device: Gait belt;Walker, rolling  Ambulation - Level of Assistance: Contact guard assistance  Gait Abnormalities: Antalgic;Decreased step clearance; Step to gait  Right Side Weight Bearing: As tolerated  Speed/Jody: Slow;Delayed        Pain:  Pain level pre-treatment: 4/10  Pain level post-treatment: 5/10   Pain Intervention(s): Medication (see MAR); Rest, Ice, Repositioning   Response to intervention: Nurse notified, See doc flow    Activity Tolerance:   Fair/fair-  Please refer to the flowsheet for vital signs taken during this treatment.   After treatment:   [] Patient left in no apparent distress sitting up in chair  [x] Patient left in no apparent distress in bed  [x] Call bell left within reach  [x] Nursing notified  [] Caregiver present  [] Bed alarm activated  [] SCDs applied      COMMUNICATION/EDUCATION:   [x]         Role of Physical Therapy in the acute care setting. [x]         Fall prevention education was provided and the patient/caregiver indicated understanding. [x]         Patient/family have participated as able in working toward goals and plan of care. [x]         Patient/family agree to work toward stated goals and plan of care. []         Patient understands intent and goals of therapy, but is neutral about his/her participation.   []         Patient is unable to participate in stated goals/plan of care: ongoing with therapy staff.  []         Other:        Radha Cabrales PTA   Time Calculation: 31 mins

## 2021-07-09 NOTE — PROGRESS NOTES
Progress Note  Date:2021       Room:[facility]  Patient Name:Queenie Villanueva     YOB: 1975     Age:45 y.o. Subjective    Subjective Another syncopal episode  Review of Systems Feels good  Objective         Vitals Last 24 Hours:  TEMPERATURE:  Temp  Av.7 °F (37.1 °C)  Min: 98.5 °F (36.9 °C)  Max: 98.8 °F (37.1 °C)  RESPIRATIONS RANGE: Resp  Av.1  Min: 14  Max: 28  PULSE OXIMETRY RANGE: SpO2  Av.9 %  Min: 96 %  Max: 100 %  PULSE RANGE: Pulse  Av.2  Min: 57  Max: 117  BLOOD PRESSURE RANGE: Systolic (08SBW), BJB:985 , Min:56 , EMF:934   ; Diastolic (73REY), FRAN:32, Min:33, Max:85      I/O (24Hr): Intake/Output Summary (Last 24 hours) at 2021 1253  Last data filed at 2021 0997  Gross per 24 hour   Intake 3592.5 ml   Output 1525 ml   Net 2067.5 ml     ObjectiveRight knee dressing. No signs infection N/V intact  Labs/Imaging/Diagnostics    Labs:  CBC:  Recent Labs     21  05021  1845   WBC 12.6 14.3*   RBC 3.36* 3.49*   HGB 10.2* 10.8*   HCT 31.5* 32.9*   MCV 93.8 94.3   RDW 15.8* 15.9*    301     CHEMISTRIES:  Recent Labs     21  0351 21  0502 21  2245 21  1845 21  1845    141 140   < > 143   K 3.5 3.9 3.8   < > 3.5   * 109 107   < > 113*   CO2 25 27 27   < > 22   BUN 9 11 12   < > 12   CREA 0.62 0.90 0.96   < > 0.58*   CA 7.8* 8.0* 8.3*   < > 6.6*   MG  --   --   --   --  1.7    < > = values in this interval not displayed. PT/INR:  Recent Labs     21  2245   INR 1.3*     APTT:No results for input(s): APTT in the last 72 hours. LIVER PROFILE:  Recent Labs     21  0351 21  0502 21  2245   AST 9* 9* 12   ALT 13 13 16     Lab Results   Component Value Date/Time    ALT (SGPT) 13 2021 03:51 AM    AST (SGOT) 9 (L) 2021 03:51 AM    Alk. phosphatase 56 2021 03:51 AM    Bilirubin, total 0.6 2021 03:51 AM       Imaging Last 24 Hours:  No results found.   Assessment//Plan Patient Active Problem List    Diagnosis Date Noted    Syncope 07/09/2021    Status post total right knee replacement 07/08/2021    Hypotension 07/08/2021    Hypothyroid     Intestinal malabsorption     Anxiety     Athyroidism (acquired)     Morbid obesity ([facility] [address]     Morbid obesity with body mass index (BMI) of 40.0 to 49.9 ([facility])     Smoking history     Status post gastric bypass for obesity 2005     Assessment & Plan: Will transfer patient to hospitalist. Orthopaedically cleared for discharge.  All meds for DC written     Will transfer to Hospitalist service        Electronically signed by Orin Gregorio MD on 7/9/2021 at 12:53 PM

## 2021-07-09 NOTE — PROGRESS NOTES
[facility]  Pulmonary, Critical Care, and Sleep Medicine    Name: Rukhsana Juarez MRN: [id number]   : 1975 Hospital: [facility] [place]    Date: 2021  Room: 106/01     [facility] Note                                              Consult requesting physician: Dr. Cristina Bae  Reason for Consult: Unresponsiveness      IMPRESSION:   Unresponsiveness R41.89     Hypotension I95.9 ·     ·   Patient Active Problem List   Diagnosis Code    Hypothyroid E03.9    Status post gastric bypass for obesity Z98.84    Intestinal malabsorption K90.9    Anxiety F41.9    Athyroidism (acquired) E03.9    Morbid obesity ([facility] [address] E66.01    Morbid obesity with body mass index (BMI) of 40.0 to 49.9 ([facility]) E66.01    Smoking history Z87.891    Status post total right knee replacement Z96.651    Hypotension I95.9       · Code status: Full code      RECOMMENDATIONS:   Respiratory:   No acute issues. On room air SPO2 98%. Protecting airways. Keep SPO2 >=92%. HOB 30 degree elevation all the time. Aggressive pulmonary toileting. Aspiration precautions. Incentive spirometry. CVS:   Syncope x2 with hypotension. Orthostatic blood pressure:  Lying flat /76; heart rate 87. Sitting up in bed /63; heart rate 78. Due to severe obesity and risk for fall, standing blood pressure was not checked. Continue IV fluid. Cardiac enzymes unremarkable. CTA chest 2021: Negative for PE. Echo 2021: LVEF 55 to 60%. Grade 1 DD. TV not well visualized. No stenosis. Inadequate TR to estimate RVSP. If persistent orthostatic hypotension; then may consider midodrine. Discussed with Dr. Ivan Azevedo; cardiology consulted. ID: Mild leukocytosis, likely postop or due to stress; resolved. No fever. Very mild lactic acidosis after first event of syncope; resolved. Blood culture: NGTD.   Antibiotics: Vancomycin and Zosyn started during RRT by hospitalist team; recommend very short course or stopping antibiotics if there is no signs of surgical site infection. Defer to hospitalist team.  Follow cultures. Deescalate antibiotic when appropriate. Musculoskeletal: S/p right TKR; site under dressing; being followed by orthopedics. PT OT following. Hematology/Oncology: Anemia, likely chronic. Reported estimated blood loss intraoperatively only 50 mL. Renal: Normal creatinine and electrolytes; monitor urine output and electrolytes and creatinine. GI/: No acute issues. Endocrine: Monitor BS. Hx bilateral thyroid nodule removed many years ago; patient reported. TSH and free T4 7/7/2021: Normal.  Continue Synthroid 125 mcg p.o. daily. 1.6 cm low-density left thyroid nodule incidentally found on CTA chest 7/8/2021; radiologist recommended ultrasound. Discussed with patient who will follow this with PCP upon discharge. Defer to hospitalist team.  Neurology: Unresponsiveness while sitting on toilet and again while standing upright; likely vasovagal versus orthostatic hypotension. Echo as above. CT head unremarkable. Mental status normalized. Toxicology: No acute issues. Pain/Sedation: On narcotic pain medication as needed for right knee surgical site pain; recommend judicious use of pain medication. Skin/Wound: Local dressing at right knee surgical site  Electrolytes: Replace electrolytes per ICU electrolyte replacement protocol. IVF: NS at 150 mL/h. Nutrition: P.o. diet  Prophylaxis: DVT Prophylaxis: SCD/Lovenox. GI Prophylaxis: Pepcid. Restraints: none  Lines/Tubes: PIV  Patient does not have Rivera catheter or central line. PT/OT eval and treat. OOB. Will defer respective systems problem management to primary and other respective consultant and follow patient in ICU with primary and other medical team.  Further recommendations will be based on the patient's response to recommended treatment and results of the investigation ordered.   Quality Care: PPI, DVT prophylaxis, HOB elevated, Infection control all reviewed and addressed. Care of plan d/w hospitalist team Dr. Radha Packer, RN, RT, MDR, Dr. Sammy Meza  D/w patient (answered all questions to satisfaction). High complexity decision making was performed during the evaluation of this patient at high risk for decompensation with multiple organ involvement. CC time excluding patient/family discussion and education: 32 minutes. Transfer to telemetry. Once transferred; PCCM will sign off; call us with any questions. Subjective/History of Present Illness:     Patient is a 50 y.o. female with PMHx significant for Obese s/p gastric bypass 2005, OA s/p right TKR on 7/7/21; RRT for unresponsiveness while on the toilet on 7/7/2021 (no cardiorespiratory arrest or CPR needed); transferred to ICU for close monitoring. Patient became unresponsive while sitting on toilet on 7/7/2021; weakness by physical therapist.  Did not lose pulse and did not stop breathing; did not require CPR. Patient had received narcotic pain medication prior to this episode. Received Narcan during RRT. CTA negative for PE  CT head unremakable. CT abdomen/pelvis unremarkable. 7/9/2021:  Remains in ICU room 106. Patient was supposed to be discharged home yesterday; but while getting up to the chair, while standing; patient felt lightheaded, dizzy, feeling like passing out, feeling hot; followed by losing consciousness for a few seconds; SBP was in 50s reported by RN. I was not aware of this incident. Patient received Albumin and continued on IV fluid. Discharge was canceled. Patient remained in ICU. Orthostatic blood pressure check as mentioned in assessment and plan section today. CTA negative for PE. Echo unremarkable except unable to estimate RVSP. On room air SPO2 in high 90s. SBP in 110s now. While lying in bed, at present she denies any lightheadedness, dizziness, chest pain, fever, cough, dyspnea. Right knee under dressing.   PCCM was not called for any issues overnight. No other overnight issues reported. I/O last 24 hrs: Intake/Output Summary (Last 24 hours) at 2021 1055  Last data filed at 2021 0997  Gross per 24 hour   Intake 4552.5 ml   Output 1525 ml   Net 3027.5 ml         The patient is critically ill     History taken from patient, EMR     Review of Systems:   HEENT: No epistaxis, no nasal drainage, no difficulty in swallowing, no redness in eyes  Respiratory: as above  Cardiovascular: no chest pain, no palpitations, no chronic leg edema, no syncope  Gastrointestinal: no abd pain, no vomiting, no diarrhea, no bleeding symptoms  Genitourinary: No urinary symptoms or hematuria  Musculoskeletal: Right knee under dressing. Pain is controlled.   Neurological: No focal weakness, no seizures, no headaches  Behvioral/Psych: No anxiety, no depression  Constitutional: No fever, no chills, no weight loss, no night sweats     Allergies   Allergen Reactions    Codeine Other (comments)     Burning stomach and chest        Tylenol-Codeine #3 [Acetaminophen-Codeine] Other (comments)     Can take tylenol      Past Medical History:   Diagnosis Date    Anxiety     Arthritis     Athyroidism (acquired)     goiter issues    GERD (gastroesophageal reflux disease)     Hypothyroid     Intestinal malabsorption     Morbid obesity ([address]     Morbid obesity with body mass index (BMI) of 40.0 to 49.9 ([facility])     Smoking history     quit     Status post gastric bypass for obesity     laparoscopic / [place] [facility]      Past Surgical History:   Procedure Laterality Date    HX HEENT      thyroidectomy    HX KNEE ARTHROSCOPY Right     HX LAP GASTRIC BYPASS      [facility]    HX THYROIDECTOMY      HX TUBAL LIGATION        Social History     Tobacco Use    Smoking status: Former Smoker     Quit date:      Years since quittin.5    Smokeless tobacco: Never Used   Substance Use Topics    Alcohol use: Yes     Comment: 1-2 x year      Family History Problem Relation Age of Onset    Heart Disease Father       Prior to Admission medications    Medication Sig Start Date End Date Taking? Authorizing Provider   acetaminophen (TYLENOL) 325 mg tablet Take 2 Tablets by mouth every six (6) hours. 7/8/21  Yes Nasim Padilla MD   oxyCODONE IR (ROXICODONE) 5 mg immediate release tablet Take 1 Tablet by mouth every six (6) hours as needed for Pain for up to 14 days. Max Daily Amount: 20 mg. 7/8/21 7/22/21 Yes Nasim Padilla MD   aspirin 81 mg chewable tablet Take 2 Tablets by mouth daily. 7/8/21  Yes Nasim Padilla MD   acetaminophen (Tylenol Arthritis Pain) 650 mg TbER Take 650 mg by mouth every eight (8) hours. Yes Provider, Historical   levothyroxine (SYNTHROID) 125 mcg tablet Take 125 mcg by mouth Daily (before breakfast). Yes Provider, Historical   citalopram (CELEXA) 10 mg tablet Take 10 mg by mouth daily. Yes Provider, Historical   famotidine (PEPCID) 40 mg tablet Take 40 mg by mouth daily. Yes Provider, Historical   cyanocobalamin (VITAMIN B-12) 1,000 mcg sublingual tablet 1,000 mcg by SubLINGual route daily. 1/8/20  Yes Provider, Historical   ascorbic acid, vitamin C, (Vitamin C) 500 mg tablet Take  by mouth. Yes Provider, Historical   omeprazole (PRILOSEC) 40 mg capsule Take 40 mg by mouth nightly.     Provider, Historical   ergocalciferol (ERGOCALCIFEROL) 1,250 mcg (50,000 unit) capsule Vitamin D2 1,250 mcg (50,000 unit) capsule    Provider, Historical     Current Facility-Administered Medications   Medication Dose Route Frequency    0.9% sodium chloride infusion  150 mL/hr IntraVENous CONTINUOUS    piperacillin-tazobactam (ZOSYN) 3.375 g in 0.9% sodium chloride (MBP/ADV) 100 mL MBP  3.375 g IntraVENous Q8H    albumin human 25% (BUMINATE) solution 25 g  25 g IntraVENous Q6H    [Held by provider] citalopram (CELEXA) tablet 10 mg  10 mg Oral DAILY    famotidine (PEPCID) tablet 40 mg  40 mg Oral DAILY    levothyroxine (SYNTHROID) tablet 125 mcg  125 mcg Oral ACB    pantoprazole (PROTONIX) tablet 40 mg  40 mg Oral ACB    sodium chloride (NS) flush 5-40 mL  5-40 mL IntraVENous Q8H    acetaminophen (TYLENOL) tablet 650 mg  650 mg Oral Q6H    enoxaparin (LOVENOX) injection 40 mg  40 mg SubCUTAneous DAILY         Objective:   Vital Signs:    Visit Vitals  BP (!) 107/55   Pulse 90   Temp 98.8 °F (37.1 °C)   Resp 24   Ht 5' 5\" (1.651 m)   Wt 129.7 kg (286 lb)   SpO2 97%   BMI 47.59 kg/m²       O2 Device: None (Room air)   O2 Flow Rate (L/min): 2 l/min   Temp (24hrs), Av.7 °F (37.1 °C), Min:98.5 °F (36.9 °C), Max:98.8 °F (37.1 °C)       Intake/Output:   Last shift:      No intake/output data recorded. Last 3 shifts:  1901 -  0700  In: 5350 [I.V.:5350]  Out: 2450 [Urine:2450]      Intake/Output Summary (Last 24 hours) at 2021 1055  Last data filed at 2021 0623  Gross per 24 hour   Intake 4552.5 ml   Output 1525 ml   Net 3027.5 ml       Last 3 Recorded Weights in this Encounter    21 0745 21 2030 21 0854   Weight: 129.9 kg (286 lb 6.4 oz) (P) 133.3 kg (293 lb 14 oz) 129.7 kg (286 lb)             Recent Labs     21  1815   PHI 7.36   PCO2I 41.4   PO2I 104*   HCO3I 23.4   FIO2I 2       Physical Exam:     General/Neurology: Alert, awake and oriented. NAD. Obese. Head:   NCAT. Eye:   EOM intact. No icterus/pallor/cyanosis. Nose:   No nasal drainage/discharge. Neck:   Trachea midline. Lung: Moderate air entry bilateral equal.  No rales, rhonchi. No wheezing or stridor. No prolonged expiration or accessory muscle use. Heart:   S1 S2 present. No murmur or JVD. Abdomen:  Soft. NT. ND. No palpable masses. Extremities:  No edema. No cyanosis or clubbing. Right knee under dressing. Pulses: 2+ and symmetric in DP.       Data:       Recent Results (from the past 24 hour(s))   GLUCOSE, POC    Collection Time: 21  1:38 PM   Result Value Ref Range    Glucose (POC) 127 (H) 70 - 360 mg/dL   METABOLIC PANEL, COMPREHENSIVE    Collection Time: 07/09/21  3:51 AM   Result Value Ref Range    Sodium 145 136 - 145 mmol/L    Potassium 3.5 3.5 - 5.5 mmol/L    Chloride 116 (H) 100 - 111 mmol/L    CO2 25 21 - 32 mmol/L    Anion gap 4 3.0 - 18 mmol/L    Glucose 83 74 - 99 mg/dL    BUN 9 7.0 - 18 MG/DL    Creatinine 0.62 0.6 - 1.3 MG/DL    BUN/Creatinine ratio 15 12 - 20      GFR est AA >60 >60 ml/min/1.73m2    GFR est non-AA >60 >60 ml/min/1.73m2    Calcium 7.8 (L) 8.5 - 10.1 MG/DL    Bilirubin, total 0.6 0.2 - 1.0 MG/DL    ALT (SGPT) 13 13 - 56 U/L    AST (SGOT) 9 (L) 10 - 38 U/L    Alk. phosphatase 56 45 - 117 U/L    Protein, total 5.5 (L) 6.4 - 8.2 g/dL    Albumin 3.3 (L) 3.4 - 5.0 g/dL    Globulin 2.2 2.0 - 4.0 g/dL    A-G Ratio 1.5 0.8 - 1.7     EKG, 12 LEAD, SUBSEQUENT    Collection Time: 07/09/21 10:37 AM   Result Value Ref Range    Ventricular Rate 74 BPM    Atrial Rate 74 BPM    P-R Interval 180 ms    QRS Duration 88 ms    Q-T Interval 398 ms    QTC Calculation (Bezet) 441 ms    Calculated P Axis 62 degrees    Calculated R Axis 84 degrees    Calculated T Axis 32 degrees    Diagnosis       Normal sinus rhythm  Low voltage QRS  Nonspecific T wave abnormality  Abnormal ECG  When compared with ECG of 07-JUL-2021 17:42,  No significant change was found           Chemistry Recent Labs     07/09/21  0351 07/08/21  0502 07/07/21  2245 07/07/21  1845 07/07/21  1845   GLU 83 120* 143*   < > 125*    141 140   < > 143   K 3.5 3.9 3.8   < > 3.5   * 109 107   < > 113*   CO2 25 27 27   < > 22   BUN 9 11 12   < > 12   CREA 0.62 0.90 0.96   < > 0.58*   CA 7.8* 8.0* 8.3*   < > 6.6*   MG  --   --   --   --  1.7   AGAP 4 5 6   < > 8   BUCR 15 12 13   < > 21*   AP 56 70 82   < > 67   TP 5.5* 5.7* 6.4   < > 5.2*   ALB 3.3* 2.8* 3.1*   < > 2.5*   GLOB 2.2 2.9 3.3   < > 2.7   AGRAT 1.5 1.0 0.9   < > 0.9    < > = values in this interval not displayed.         Lactic Acid Lactic acid   Date Value Ref Range Status   07/08/2021 0.9 0.4 - 2.0 MMOL/L Final     Recent Labs     07/08/21  1000 07/08/21  0502 07/07/21  2245   LAC 0.9 1.9 2.5*        Liver Enzymes Protein, total   Date Value Ref Range Status   07/09/2021 5.5 (L) 6.4 - 8.2 g/dL Final     Albumin   Date Value Ref Range Status   07/09/2021 3.3 (L) 3.4 - 5.0 g/dL Final     Globulin   Date Value Ref Range Status   07/09/2021 2.2 2.0 - 4.0 g/dL Final     A-G Ratio   Date Value Ref Range Status   07/09/2021 1.5 0.8 - 1.7   Final     Alk. phosphatase   Date Value Ref Range Status   07/09/2021 56 45 - 117 U/L Final     Recent Labs     07/09/21  0351 07/08/21  0502 07/07/21  2245   TP 5.5* 5.7* 6.4   ALB 3.3* 2.8* 3.1*   GLOB 2.2 2.9 3.3   AGRAT 1.5 1.0 0.9   AP 56 70 82        CBC w/Diff Recent Labs     07/08/21  0502 07/07/21  1845   WBC 12.6 14.3*   RBC 3.36* 3.49*   HGB 10.2* 10.8*   HCT 31.5* 32.9*    301   GRANS 77*  --    LYMPH 15*  --    EOS 0  --         Cardiac Enzymes No results found for: CPK, CK, CKMMB, CKMB, RCK3, CKMBT, CKNDX, CKND1, SARA, TROPT, TROIQ, ALEJANDRA, TROPT, TNIPOC, BNP, BNPP     BNP No results found for: BNP, BNPP, XBNPT     Coagulation Recent Labs     07/07/21  2245   PTP 15.8*   INR 1.3*         Thyroid  Lab Results   Component Value Date/Time    TSH 1.99 07/07/2021 06:45 PM       No results found for: T4     Urinalysis No results found for: COLOR, APPRN, SPGRU, REFSG, MARQUES, PROTU, GLUCU, KETU, BILU, UROU, MAURISIO, LEUKU, GLUKE, EPSU, BACTU, WBCU, RBCU, CASTS, UCRY     Micro  Recent Labs     07/07/21 2245   CULT NO GROWTH 2 DAYS     Recent Labs     07/07/21 2245   CULT NO GROWTH 2 DAYS          Culture data during this hospitalization.    All Micro Results     Procedure Component Value Units Date/Time    CULTURE, BLOOD [[id number]] Collected: 07/07/21 2245    Order Status: Completed Specimen: Blood Updated: 07/09/21 0711     Special Requests: NO SPECIAL REQUESTS        Culture result: NO GROWTH 2 DAYS       CULTURE, URINE [[id number]]     Order Status: Sent Specimen: Urine from Clean catch            CT abdomen/pelvis 7/8/2021:  No acute intra-abdominal process. CT head 7/8/2021:  No acute intracranial abnormalities. CTA chest 7/8/2021:  1. No evidence of pulmonary embolism. 2.  No active cardiopulmonary disease. 3. 1.6 cm low-density left thyroid nodule. Recommend ultrasound follow-up. (Discussed with radiologist and confirmed the size of 1.6 cm). PVL LE 7/7/2021:  · No evidence of deep vein thrombosis in the right lower extremity. · No evidence of deep vein thrombosis in the left lower extremity. Limited visualization of right lower extremity due to extensive bandages from knee surgery. Echo 7/8/2021:  Result status: Final result   · Left Ventricle: Normal cavity size, wall thickness and systolic function (ejection fraction normal). The estimated EF is 55 - 60%. There is mild (grade 1) left ventricular diastolic dysfunction E'E= 6.67. Wall Scoring: The left ventricular wall motion is normal.  · Tricuspid Valve: Tricuspid valve not well visualized. No stenosis. Tricuspid regurgitation is inadequate for estimation of right ventricular systolic pressure. ·Please note: Voice-recognition software may have been used to generate this report, which may have resulted in some phonetic-based errors in grammar and contents. Even though attempts were made to correct all the mistakes, some may have been missed, and remained in the body of the document.       Jaclyn Moran MD  7/9/2021

## 2021-07-09 NOTE — PROGRESS NOTES
Problem: Falls - Risk of  Goal: *Absence of Falls  Description: Document Claude Randhawa Fall Risk and appropriate interventions in the flowsheet. Outcome: Progressing Towards Goal  Note: Fall Risk Interventions:  Mobility Interventions: Patient to call before getting OOB         Medication Interventions: Evaluate medications/consider consulting pharmacy, Patient to call before getting OOB    Elimination Interventions: Call light in reach, Toileting schedule/hourly rounds, Patient to call for help with toileting needs              Problem: Patient Education: Go to Patient Education Activity  Goal: Patient/Family Education  Outcome: Progressing Towards Goal     Problem: Pressure Injury - Risk of  Goal: *Prevention of pressure injury  Description: Document Deng Scale and appropriate interventions in the flowsheet. Outcome: Progressing Towards Goal  Note: Pressure Injury Interventions:             Activity Interventions: Pressure redistribution bed/mattress(bed type)    Mobility Interventions: Pressure redistribution bed/mattress (bed type)    Nutrition Interventions: Document food/fluid/supplement intake                     Problem: Patient Education: Go to Patient Education Activity  Goal: Patient/Family Education  Outcome: Progressing Towards Goal

## 2021-07-09 NOTE — CONSULTS
[facility] Consult Note      Patient: Clif Agarwal MRN: [id number]  SSN: [id number]    YOB: 1975  Age: 50 y.o. Sex: female          Date of Consultation: 7/9/2021  Referring Physician: Dr. Malcom Hancock  Reason for Consultation: syncope    HPI:  I was asked by Pauly Flores for syncope. Laine Dietrich is a 13-year-old very pleasant patient underwent right knee surgery on 07/07/2021. Patient was doing her physical therapy and then on the toilet she briefly became dizzy/presyncope/syncope without any loss of pulse and did not stop breathing. Patient was on pain medication and patient was given narcotic. Patient have a second episode also when she came out of bed to chair for physical therapy have similar kind of brief episode. Echocardiogram have shown normal EF and wall motion and negative cardiac enzyme CTA chest is negative for pulmonary embolism. Patient denied chest pain, shortness breath, palpitation, presyncope and syncope. According to the patient she normally have low blood pressure, systolic blood pressure in 90s.     Past Medical History:   Diagnosis Date    Anxiety     Arthritis     Athyroidism (acquired)     goiter issues    GERD (gastroesophageal reflux disease)     Hypothyroid     Intestinal malabsorption     Morbid obesity (HCC)     Morbid obesity with body mass index (BMI) of 40.0 to 49.9 (HCC)     Smoking history     quit 2000    Status post gastric bypass for obesity 2005    laparoscopic / [facility]bert     Past Surgical History:   Procedure Laterality Date    HX HEENT  2000    thyroidectomy    HX KNEE ARTHROSCOPY Right     HX LAP GASTRIC BYPASS  2005    [facility]    HX THYROIDECTOMY      HX TUBAL LIGATION       Current Facility-Administered Medications   Medication Dose Route Frequency    [START ON 7/10/2021] multivitamin, tx-iron-ca-min (THERA-M w/ IRON) tablet 1 Tablet  1 Tablet Oral DAILY    0.9% sodium chloride infusion  150 mL/hr IntraVENous CONTINUOUS    piperacillin-tazobactam (ZOSYN) 3.375 g in 0.9% sodium chloride (MBP/ADV) 100 mL MBP  3.375 g IntraVENous Q8H    albumin human 25% (BUMINATE) solution 25 g  25 g IntraVENous Q6H    [Held by provider] citalopram (CELEXA) tablet 10 mg  10 mg Oral DAILY    famotidine (PEPCID) tablet 40 mg  40 mg Oral DAILY    levothyroxine (SYNTHROID) tablet 125 mcg  125 mcg Oral ACB    pantoprazole (PROTONIX) tablet 40 mg  40 mg Oral ACB    sodium chloride (NS) flush 5-40 mL  5-40 mL IntraVENous Q8H    sodium chloride (NS) flush 5-40 mL  5-40 mL IntraVENous PRN    acetaminophen (TYLENOL) tablet 650 mg  650 mg Oral Q6H    [Held by provider] oxyCODONE IR (ROXICODONE) tablet 10 mg  10 mg Oral Q4H PRN    [Held by provider] HYDROmorphone (PF) (DILAUDID) injection 1 mg  1 mg IntraVENous Q4H PRN    [Held by provider] zolpidem (AMBIEN) tablet 5 mg  5 mg Oral QHS PRN    enoxaparin (LOVENOX) injection 40 mg  40 mg SubCUTAneous DAILY    oxyCODONE IR (ROXICODONE) tablet 5 mg  5 mg Oral Q6H PRN       Allergies and Intolerances: Allergies   Allergen Reactions    Codeine Other (comments)     Burning stomach and chest        Tylenol-Codeine #3 [Acetaminophen-Codeine] Other (comments)     Can take tylenol       Family History:   Family History   Problem Relation Age of Onset    Heart Disease Father        Social History:   She  reports that she quit smoking about 21 years ago. She has never used smokeless tobacco.  She  reports current alcohol use. Review of Systems:     Review of Systems  Positive for near syncope/syncope    Gen: No fever, chills, malaise, weight loss/gain. Heent: No headache, rhinorrhea, epistaxis, ear pain, hearing loss, sinus pain, neck pain/stiffness, sore throat. Heart: No chest pain, palpitations, MONTANEZ, pnd, or orthopnea. Resp: No cough, hemoptysis, wheezing and shortness of breath. GI: No nausea, vomiting, diarrhea, constipation, melena or hematochezia. : No urinary obstruction, dysuria or hematuria.    Derm: No rash, new skin lesion or pruritis. Musc/skeletal: no bone and + joint complains. Vasc: No edema, cyanosis or claudication. Endo: No heat/cold intolerance, no polyuria,polydipsia or polyphagia. Neuro: No unilateral weakness, numbness, tingling. No seizures. Heme: No easy bruising or bleeding. Physical:   Patient Vitals for the past 6 hrs:   Temp Pulse Resp BP SpO2   07/09/21 1453 98.8 °F (37.1 °C) 90 18 (!) 102/55 100 %   07/09/21 1200 98.9 °F (37.2 °C)       07/09/21 1130  81 18 109/60 99 %   07/09/21 1100  81 21 107/73 100 %   07/09/21 1000  74 23 113/65 99 %         Exam:   General Appearance: Comfortable, not using accessory muscles of respiration. HEENT: TYRONE. HEAD: Atraumatic  NECK: No JVD, no thyroidomeglay. LUNGS: Clear bilaterally. HEART: S1+S2     ABD: Non-tender, BS Audible    EXT: No edema, and no cysnosis. VASCULAR EXAM: Pulses are intact. PSYCHIATRIC EXAM: Mood is appropriate. MUSCULOSKELETAL: Grossly no joint deformity. Right knee replacement  NEUROLOGICAL: Motor and sensory sytem intact and Cranial nerves II-XII intact. Review of Data:   LABS:   Lab Results   Component Value Date/Time    WBC 12.6 07/08/2021 05:02 AM    HGB 10.2 (L) 07/08/2021 05:02 AM    HCT 31.5 (L) 07/08/2021 05:02 AM    PLATELET 130 94/00/6805 05:02 AM     Lab Results   Component Value Date/Time    Sodium 145 07/09/2021 03:51 AM    Potassium 3.5 07/09/2021 03:51 AM    Chloride 116 (H) 07/09/2021 03:51 AM    CO2 25 07/09/2021 03:51 AM    Glucose 83 07/09/2021 03:51 AM    BUN 9 07/09/2021 03:51 AM    Creatinine 0.62 07/09/2021 03:51 AM     No results found for: CHOL, CHOLX, CHLST, CHOLV, HDL, HDLP, LDL, LDLC, DLDLP, TGLX, TRIGL, TRIGP  No components found for: GPT  No results found for: HBA1C, JIY5GPHK, ZXO3EHAP    RADIOLOGY:  CT Results  (Last 48 hours)               07/08/21 0314  CT HEAD WO CONT Final result    Impression:      No acute intracranial abnormalities.        Narrative:  EXAM: CT head INDICATION: Altered mental status. COMPARISON: None. TECHNIQUE: Axial CT imaging of the head was performed without intravenous   contrast. Dose reduction techniques used: automated exposure control, adjustment   of the mAs and/or kVp according to patient size, and iterative reconstruction   techniques. Digital imaging and communications in Medicine (DICOM) format image   data are available to nonaffiliated external healthcare facilities or entities   on a secure, media free, reciprocally searchable basis with patient   authorization for at least 12 months after this study. _______________       FINDINGS:       BRAIN AND POSTERIOR FOSSA: The sulci, folia, ventricles and basal cisterns are   within normal limits for the patient's age. There is no intracranial hemorrhage,   mass effect, or midline shift. There are no areas of abnormal parenchymal   attenuation. EXTRA-AXIAL SPACES AND MENINGES: There are no abnormal extra-axial fluid   collections. CALVARIUM: Intact. SINUSES: Clear. OTHER: None.       _______________           07/08/21 0314  CT ABD PELV W CONT Final result    Impression:      No acute intra-abdominal process. Narrative:  EXAM: CT of the abdomen and pelvis       INDICATION: Pain. COMPARISON: None. TECHNIQUE: Axial CT imaging of the abdomen and pelvis was performed with   intravenous contrast. Multiplanar reformats were generated. Dose reduction   techniques used: automated exposure control, adjustment of the mAs and/or kVp   according to patient size, and iterative reconstruction techniques. Digital   imaging and communications in Medicine (DICOM) format image data are available   to nonaffiliated external healthcare facilities or entities on a secure, media   free, reciprocally searchable basis with patient authorization for at least 12   months after this study.    _______________       FINDINGS:       LOWER CHEST: There is minimal scarring at the left lung base. LIVER, BILIARY: Liver is normal. No biliary dilation. Gallbladder is   unremarkable. PANCREAS: Normal.       SPLEEN: Normal.       ADRENALS: Normal.       KIDNEYS: Normal.       LYMPH NODES: No enlarged lymph nodes. GASTROINTESTINAL TRACT: There has been a prior gastric sleeve procedure. PELVIC ORGANS: Unremarkable. VASCULATURE: Unremarkable. BONES: No acute or aggressive osseous abnormalities identified. OTHER: None.       _______________               CXR Results  (Last 48 hours)               07/07/21 2034  XR CHEST PORT Final result    Impression:  No acute process       Narrative:  EXAM:  AP Portable Chest X-ray 1 view        INDICATION: Hypoxia       COMPARISON: November 15, 2020       _______________       FINDINGS:  Heart and mediastinal contours are within normal limits for portable   radiograph. Lungs are clear of active disease. There are no pleural effusions. No acute osseous findings.        ________________                       Cardiology Procedures:   Results for orders placed or performed during the hospital encounter of 07/07/21   EKG, 12 LEAD, INITIAL   Result Value Ref Range    Ventricular Rate 64 BPM    Atrial Rate 64 BPM    P-R Interval 170 ms    QRS Duration 84 ms    Q-T Interval 420 ms    QTC Calculation (Bezet) 433 ms    Calculated P Axis 46 degrees    Calculated R Axis 44 degrees    Calculated T Axis 17 degrees    Diagnosis       Normal sinus rhythm  Nonspecific T wave abnormality  Abnormal ECG  When compared with ECG of 21-JUN-2021 13:36,  Nonspecific T wave abnormality now evident in Inferior leads  Inverted T waves have replaced nonspecific T wave abnormality in Anterior   leads  Confirmed by Citlalli Corona MD. ([id number]) on 7/7/2021 5:48:53 PM        Echo Results  (Last 48 hours)    None       Cardiolite (Tc-99m Sestamibi) stress test        Impression / Plan:    Patient Active Problem List   Diagnosis Code    Hypothyroid E03.9    Status post gastric bypass for obesity Z98.84    Intestinal malabsorption K90.9    Anxiety F41.9    Athyroidism (acquired) E03.9    Morbid obesity (HCC) E66.01    Morbid obesity with body mass index (BMI) of 40.0 to 49.9 (HCC) E66.01    Smoking history Z87.891    Status post total right knee replacement Z96.651    Hypotension I95.9    Syncope R55       Assessment and plan    Near syncope/syncope  Chronic low normal blood pressure, systolic blood pressure is in 90s according to the patient  Morbid obesity  History of bariatric surgery  History of thyroid surgery  Arthritis      Plan. At this point there is no evidence of any significant arrhythmia. ACS is ruled out and CT scan of chest is negative for pulmonary embolism. EF is normal.  Patient have chronic low normal blood pressure. I have encouraged the patient to increase water/salt consumption  Continue to monitor  Possible etiology of her presyncope/syncope is could be pain related. Management plan was discussed with patient.         Signed By: eLticia Evans MD     July 9, 2021

## 2021-07-09 NOTE — PROGRESS NOTES
1945- Report and care received, assessment completed per flow sheet. Alert, oriented, NAD, denies pain. Right leg acewrap D/I.     2330- Reassessment without change. Reports current dressing is original postop dressing, MD orders noted. Dressing removed, mepilex placed, jean hose applied to right leg. Knee incision approximated, staples intact, No new bleeding noted. 0030- Refuses to wear Jean hose. 0300- Reassessment without change.

## 2021-07-09 NOTE — ROUTINE PROCESS
1500 Patient transferred in from ICU in no obvious distress breathing on room air. Chest expansion equal and adequate. Tele monitors on and audible. IVF progressing and maintained. Full assessment in epic. 1900 Bedside and Verbal shift change report given to Tea Qureshi (oncoming nurse) by Luis Mckeon (offgoing nurse). Report included the following information SBAR and Kardex.

## 2021-07-09 NOTE — PROGRESS NOTES
Hospitalist Progress Note-critical care note     Patient: Terrell Burton MRN: [id number]  CSN: [id number]    YOB: 1975  Age: 50 y.o. Sex: female    DOA: 7/7/2021 LOS:  LOS: 2 days            Chief complaint: rt knee replacement , hypotension     Assessment/Plan         Hospital Problems  Date Reviewed: 7/8/2021        Codes Class Noted POA    Status post total right knee replacement ICD-10-CM: Z96.651  ICD-9-CM: V43.65  7/8/2021 Unknown        Hypotension ICD-10-CM: I95.9  ICD-9-CM: 458.9  7/8/2021 Unknown        Morbid obesity ([facility] [address] ICD-10-CM: E66.01  ICD-9-CM: 278.01  Unknown Yes            rrt was called post surgery, so far all work up negative except orthostatic hypotension       unresponsiveness /syncope /orthostatic hypotension   Another episode yesterday afternoon before discharge-cardiologist is called per dr. Dee Pitts ns infusion and albumin infusion   cta chest negative for PE,   Ct head/pelvic/abdomen no acute issue   Echo done pending   Continue cardiac monitoring   pvl negative for dvt   D/c vanc-if continue doing well -d.c zosyn tomorrow     Hypotension-improving    Ns infusion   Albumin infusion     Morbid obesity   Need lifestyle modification   Hx of bypass     Subjective : I feel fine   rn : no acute issue , will be transferred to the floor     Addendum: case discussed with dr. José Antonio Reina would like to transfer pt to our service. Continue PT/OT. Review of systems:    General: No fevers or chills. Cardiovascular: No chest pain or pressure. No palpitations. Pulmonary: No shortness of breath. Gastrointestinal: No nausea, vomiting. Vital signs/Intake and Output:  Visit Vitals  /60   Pulse 81   Temp 98.8 °F (37.1 °C)   Resp 18   Ht 5' 5\" (1.651 m)   Wt 129.7 kg (286 lb)   SpO2 99%   BMI 47.59 kg/m²     Current Shift:  No intake/output data recorded.   Last three shifts:  07/07 1901 - 07/09 0700  In: 5350 [I.V.:5350]  Out: 2450 [Urine:2450]    Physical Exam:  General: WD, WN. Alert, cooperative, no acute distress    HEENT: NC, Atraumatic. PERRLA, anicteric sclerae. Lungs: CTA Bilaterally. No Wheezing/Rhonchi/Rales. Heart:  Regular  rhythm,  No murmur, No Rubs, No Gallops  Abdomen: Soft, Non distended, Non tender. +Bowel sounds,   Extremities: No c/c, rt leg wrapped with ace bandage   Psych:   Not anxious or agitated. Neurologic:  No acute neurological deficit. Labs: Results:       Chemistry Recent Labs     07/09/21  0351 07/08/21  0502 07/07/21  2245   GLU 83 120* 143*    141 140   K 3.5 3.9 3.8   * 109 107   CO2 25 27 27   BUN 9 11 12   CREA 0.62 0.90 0.96   CA 7.8* 8.0* 8.3*   AGAP 4 5 6   BUCR 15 12 13   AP 56 70 82   TP 5.5* 5.7* 6.4   ALB 3.3* 2.8* 3.1*   GLOB 2.2 2.9 3.3   AGRAT 1.5 1.0 0.9      CBC w/Diff Recent Labs     07/08/21  0502 07/07/21  1845   WBC 12.6 14.3*   RBC 3.36* 3.49*   HGB 10.2* 10.8*   HCT 31.5* 32.9*    301   GRANS 77*  --    LYMPH 15*  --    EOS 0  --       Cardiac Enzymes Recent Labs     07/07/21  1845   CPK 94   CKND1 CALCULATION NOT PERFORMED WHEN RESULT IS BELOW LINEAR LIMIT      Coagulation Recent Labs     07/07/21  2245   PTP 15.8*   INR 1.3*       Lipid Panel No results found for: CHOL, CHOLPOCT, CHOLX, CHLST, CHOLV, 257490, HDL, HDLP, LDL, LDLC, DLDLP, 064019, VLDLC, VLDL, TGLX, TRIGL, TRIGP, TGLPOCT, CHHD, CHHDX   BNP No results for input(s): BNPP in the last 72 hours. Liver Enzymes Recent Labs     07/09/21  0351   TP 5.5*   ALB 3.3*   AP 56      Thyroid Studies Lab Results   Component Value Date/Time    TSH 1.99 07/07/2021 06:45 PM        Procedures/imaging: see electronic medical records for all procedures/Xrays and details which were not copied into this note but were reviewed prior to creation of Plan    CT HEAD WO CONT    Result Date: 7/8/2021  EXAM: CT head INDICATION: Altered mental status. COMPARISON: None.  TECHNIQUE: Axial CT imaging of the head was performed without intravenous contrast. Dose reduction techniques used: automated exposure control, adjustment of the mAs and/or kVp according to patient size, and iterative reconstruction techniques. Digital imaging and communications in Medicine (DICOM) format image data are available to nonaffiliated external healthcare facilities or entities on a secure, media free, reciprocally searchable basis with patient authorization for at least 12 months after this study. _______________ FINDINGS: BRAIN AND POSTERIOR FOSSA: The sulci, folia, ventricles and basal cisterns are within normal limits for the patient's age. There is no intracranial hemorrhage, mass effect, or midline shift. There are no areas of abnormal parenchymal attenuation. EXTRA-AXIAL SPACES AND MENINGES: There are no abnormal extra-axial fluid collections. CALVARIUM: Intact. SINUSES: Clear. OTHER: None. _______________     No acute intracranial abnormalities. CTA CHEST W OR W WO CONT    Addendum Date: 7/8/2021    Addendum: The purpose of this addendum is to clarify impression point #3:   > Please note that there is a 1.6 cm hypodensity left thyroid nodule. This appears essentially unchanged, predominantly obscured by contrast on the prior study of 11/15/2020.   > Addendum discussed with Dr. Siobhan Ball at 11:00 AM, 7/8/2021. Result Date: 7/8/2021  EXAM: CTA chest INDICATION: Pain. COMPARISON: November 15, 2020. TECHNIQUE: Axial CT imaging from the thoracic inlet through the diaphragm with intravenous contrast. Coronal and sagittal MIP reformats were generated. Dose reduction techniques used: automated exposure control, adjustment of the mAs and/or kVp according to patient size, and iterative reconstruction techniques. Digital imaging and communications in Medicine (DICOM) format image data are available to nonaffiliated external healthcare facilities or entities on a secure, media free, reciprocally searchable basis with patient authorization for at least 12 months after this study. _______________ FINDINGS: EXAM QUALITY: Adequate. PULMONARY ARTERIES: No evidence of pulmonary embolism. MEDIASTINUM: Normal heart size. No evidence of right heart strain. Aorta is unremarkable. No pericardial effusion. LUNGS: There is mild scarring at the left lung base. No suspicious nodule or mass. No abnormal opacities. PLEURA: Normal. AIRWAY: Normal. LYMPH NODES: No enlarged nodes. UPPER ABDOMEN: Unremarkable. OTHER: No acute or aggressive osseous abnormalities identified. There is a 1.6 cm low-density left thyroid nodule. _______________     1. No evidence of pulmonary embolism. 2.  No active cardiopulmonary disease. 3. 1.3 cm low-density left thyroid nodule. Recommend ultrasound follow-up. CT ABD PELV W CONT    Result Date: 7/8/2021  EXAM: CT of the abdomen and pelvis INDICATION: Pain. COMPARISON: None. TECHNIQUE: Axial CT imaging of the abdomen and pelvis was performed with intravenous contrast. Multiplanar reformats were generated. Dose reduction techniques used: automated exposure control, adjustment of the mAs and/or kVp according to patient size, and iterative reconstruction techniques. Digital imaging and communications in Medicine (DICOM) format image data are available to nonaffiliated external healthcare facilities or entities on a secure, media free, reciprocally searchable basis with patient authorization for at least 12 months after this study. _______________ FINDINGS: LOWER CHEST: There is minimal scarring at the left lung base. LIVER, BILIARY: Liver is normal. No biliary dilation. Gallbladder is unremarkable. PANCREAS: Normal. SPLEEN: Normal. ADRENALS: Normal. KIDNEYS: Normal. LYMPH NODES: No enlarged lymph nodes. GASTROINTESTINAL TRACT: There has been a prior gastric sleeve procedure. PELVIC ORGANS: Unremarkable. VASCULATURE: Unremarkable. BONES: No acute or aggressive osseous abnormalities identified. OTHER: None. _______________     No acute intra-abdominal process.     XR CHEST PORT    Result Date: 7/7/2021  EXAM:  AP Portable Chest X-ray 1 view INDICATION: Hypoxia COMPARISON: November 15, 2020 _______________ FINDINGS:  Heart and mediastinal contours are within normal limits for portable radiograph. Lungs are clear of active disease. There are no pleural effusions. No acute osseous findings. ________________      No acute process    DUPLEX LOWER EXT VENOUS BILAT    Result Date: 7/8/2021  · No evidence of deep vein thrombosis in the right lower extremity. · No evidence of deep vein thrombosis in the left lower extremity. Limited visualization of right lower extremity due to extensive bandages from knee surgery.        Jose Rafael Swan MD

## 2021-07-10 PROCEDURE — 74011250636 HC RX REV CODE- 250/636: Performed by: ORTHOPAEDIC SURGERY

## 2021-07-10 PROCEDURE — 97116 GAIT TRAINING THERAPY: CPT

## 2021-07-10 PROCEDURE — 97535 SELF CARE MNGMENT TRAINING: CPT

## 2021-07-10 PROCEDURE — 65660000000 HC RM CCU STEPDOWN

## 2021-07-10 PROCEDURE — 97530 THERAPEUTIC ACTIVITIES: CPT

## 2021-07-10 PROCEDURE — 74011250636 HC RX REV CODE- 250/636: Performed by: HOSPITALIST

## 2021-07-10 PROCEDURE — P9047 ALBUMIN (HUMAN), 25%, 50ML: HCPCS | Performed by: HOSPITALIST

## 2021-07-10 PROCEDURE — 74011000258 HC RX REV CODE- 258: Performed by: HOSPITALIST

## 2021-07-10 PROCEDURE — 74011250637 HC RX REV CODE- 250/637: Performed by: ORTHOPAEDIC SURGERY

## 2021-07-10 RX ADMIN — Medication 10 ML: at 14:00

## 2021-07-10 RX ADMIN — Medication 10 ML: at 22:25

## 2021-07-10 RX ADMIN — PANTOPRAZOLE SODIUM 40 MG: 40 TABLET, DELAYED RELEASE ORAL at 06:41

## 2021-07-10 RX ADMIN — ACETAMINOPHEN 650 MG: 325 TABLET, FILM COATED ORAL at 17:51

## 2021-07-10 RX ADMIN — PIPERACILLIN AND TAZOBACTAM 3.38 G: 3; .375 INJECTION, POWDER, LYOPHILIZED, FOR SOLUTION INTRAVENOUS at 05:00

## 2021-07-10 RX ADMIN — ACETAMINOPHEN 650 MG: 325 TABLET, FILM COATED ORAL at 00:21

## 2021-07-10 RX ADMIN — ACETAMINOPHEN 650 MG: 325 TABLET, FILM COATED ORAL at 12:29

## 2021-07-10 RX ADMIN — ENOXAPARIN SODIUM 40 MG: 40 INJECTION SUBCUTANEOUS at 00:21

## 2021-07-10 RX ADMIN — Medication 10 ML: at 00:22

## 2021-07-10 RX ADMIN — LEVOTHYROXINE SODIUM 125 MCG: 0.03 TABLET ORAL at 06:42

## 2021-07-10 RX ADMIN — PIPERACILLIN AND TAZOBACTAM 3.38 G: 3; .375 INJECTION, POWDER, LYOPHILIZED, FOR SOLUTION INTRAVENOUS at 12:58

## 2021-07-10 RX ADMIN — ALBUMIN (HUMAN) 25 G: 0.25 INJECTION, SOLUTION INTRAVENOUS at 00:21

## 2021-07-10 RX ADMIN — Medication 10 ML: at 06:42

## 2021-07-10 RX ADMIN — FAMOTIDINE 40 MG: 20 TABLET, FILM COATED ORAL at 08:56

## 2021-07-10 RX ADMIN — PIPERACILLIN AND TAZOBACTAM 3.38 G: 3; .375 INJECTION, POWDER, LYOPHILIZED, FOR SOLUTION INTRAVENOUS at 22:25

## 2021-07-10 RX ADMIN — ACETAMINOPHEN 650 MG: 325 TABLET, FILM COATED ORAL at 06:42

## 2021-07-10 NOTE — PROGRESS NOTES
Problem: Falls - Risk of  Goal: *Absence of Falls  Description: Document Devyn Wyatt Fall Risk and appropriate interventions in the flowsheet.   Outcome: Progressing Towards Goal  Note: Fall Risk Interventions:  Mobility Interventions: Bed/chair exit alarm, Patient to call before getting OOB         Medication Interventions: Assess postural VS orthostatic hypotension, Bed/chair exit alarm, Patient to call before getting OOB    Elimination Interventions: Bed/chair exit alarm, Call light in reach, Patient to call for help with toileting needs

## 2021-07-10 NOTE — PROGRESS NOTES
1012: Pt reports just getting into bed from the bathroom and nursing trying to get a new IV, will follow up again for PT.    1116: nurse in placing a new IV, will follow up again.

## 2021-07-10 NOTE — PROGRESS NOTES
Bedside and Verbal shift change report given to Colleen Hensley RN (oncoming nurse) by Bebeto Reynaga RN (offgoing nurse). Report included the following information SBAR, Kardex, ED Summary, Intake/Output, MAR, Recent Results, Med Rec Status and Cardiac Rhythm NSR.     1920 Shift assessment completed. Patient is resting quietly with eyes wide open and chest rising and falling evenly. No signs of pain or complaints of discomfort. Shift was uneventful. Bedside and Verbal shift change report given to Mohini Miles (oncoming nurse) by Colleen Hensley RN (offgoing nurse). Report included the following information SBAR, Kardex, ED Summary, Intake/Output, MAR, Recent Results, Med Rec Status and Cardiac Rhythm NSR.

## 2021-07-10 NOTE — ROUTINE PROCESS
0740 Patient received in no distress breathing on room air. Chest expansion equal and adequate. Safety measures in place. Full assessment in epic. 1500 Dressing to Rt knee noted minimally soiled, same changed using sterile technique. Bedside and Verbal shift change report given to Manas Boswell (oncoming nurse) by Dalia Alvarado (offgoing nurse). Report included the following information SBAR and Kardex.

## 2021-07-10 NOTE — PROGRESS NOTES
Problem: Self Care Deficits Care Plan (Adult)  Goal: *Acute Goals and Plan of Care (Insert Text)  Description: Initial Occupational Therapy Goals (7/7/2021) Within 7 day(s):    1. Patient will perform grooming standing sinkside with supervision for increased independence with ADLs. 2. Patient will perform LB dressing with supervision & A/E PRN for increased independence with ADLs. 3. Patient will perform toilet transfer with supervision for increased independence with ADLs. 4. Patient will perform all aspects of toileting with supervision for increased independence with ADLs. 5. Patient will independently apply energy conservation techniques with 1 verbal cue(s)for increased independence with ADLs. 6. Patient will perform bathroom mobility with supervision for increased independence/safety with ADLs. Outcome: Progressing Towards Goal  OCCUPATIONAL THERAPY TREATMENT    Patient: Rosalia Keane (89 y.o. female)  Date: 7/10/2021  Diagnosis: DJD (degenerative joint disease) of knee [M17.10] Status post total right knee replacement  Procedure(s) (LRB):  RIGHT TOTAL KNEE ARTHROPLASTY WITH CONFORMIS  **SPEC POP** (Right) 3 Days Post-Op  Precautions: Fall, WBAT  PLOF:     Chart, occupational therapy assessment, plan of care, and goals were reviewed. ASSESSMENT:  Pt presents supine in bed at the beginning of session. Requests to walk to the bathroom. Pt performed bed mobility, supine to sit with CG-SBA, sit<>stand transfers with CG-SBA, toilet transfers, toileting and grooming with CG-SBA during this session. Pt returned to bed at the end of session in NAD. Occasional verbal cues to prevent twisting at R knee when turning or backing up with ambulation. Pt demo understanding for the same. /88 post session. Pt was left seated at EOB to participate with PT at the end of session.    Progression toward goals:  [x]          Improving appropriately and progressing toward goals  []          Improving slowly and progressing toward goals  []          Not making progress toward goals and plan of care will be adjusted     PLAN:  Patient continues to benefit from skilled intervention to address the above impairments. Continue treatment per established plan of care. Discharge Recommendations:  Home Health  Further Equipment Recommendations for Discharge:  N/A     SUBJECTIVE:   Patient stated  I am doing better.     OBJECTIVE DATA SUMMARY:   Cognitive/Behavioral Status:  Neurologic State: Alert  Orientation Level: Oriented X4  Cognition: Appropriate for age attention/concentration, Follows commands  Safety/Judgement: Fall prevention    Functional Mobility and Transfers for ADLs:   Bed Mobility:  Supine to Sit: Contact guard assistance; Additional time  Scooting: Contact guard assistance   Transfers:  Sit to Stand: Contact guard assistance;Stand-by assistance  Stand to Sit: Stand-by assistance   Toilet Transfer : Minimum assistance;Contact guard assistance  Balance:  Sitting: Intact  Standing: Intact; With support  Standing - Static: Good  Standing - Dynamic : Fair    ADL Intervention:  Grooming  Grooming Assistance: Contact guard assistance;Stand-by assistance  Position Performed: Standing  Washing Hands: Contact guard assistance;Stand-by assistance    Toileting  Toileting Assistance: Stand-by assistance  Bladder Hygiene: Stand-by assistance  Clothing Management: Stand-by assistance  Adaptive Equipment: Walker;Grab bars    Cognitive Retraining  Safety/Judgement: Fall prevention    Pain:  Pain level pre-treatment: 0/10   Pain level post-treatment: 0/10  Pain Intervention(s): Medication (see MAR); Rest, Ice, Repositioning   Response to intervention: Nurse notified, See doc flow    Activity Tolerance:    Good     Please refer to the flowsheet for vital signs taken during this treatment.   After treatment:   [x]  Patient left in no apparent distress sitting up at EOB  []  Patient left in no apparent distress in bed  [x]  Call bell left within reach  [x]  Nursing notified  []  Caregiver present  []  Bed alarm activated    COMMUNICATION/EDUCATION:   [x] Role of Occupational Therapy in the acute care setting  [x] Home safety education was provided and the patient/caregiver indicated understanding. [x] Patient/family have participated as able in working towards goals and plan of care. [x] Patient/family agree to work toward stated goals and plan of care. [] Patient understands intent and goals of therapy, but is neutral about his/her participation. [] Patient is unable to participate in goal setting and plan of care.       Thank you for this referral.  Vaishnavi Stanton, OTR/L  Time Calculation: 25 mins

## 2021-07-10 NOTE — PROGRESS NOTES
Hospitalist Progress Note    Patient: Rae Reid MRN: [id number]  [facility]: [id number]    YOB: 1975  Age: 50 y.o. Sex: female    DOA: 7/7/2021 LOS:  LOS: 3 days            Patient Active Problem List   Diagnosis Code    Hypothyroid E03.9    Status post gastric bypass for obesity Z98.84    Intestinal malabsorption K90.9    Anxiety F41.9    Athyroidism (acquired) E03.9    Morbid obesity ([address] E66.01    Morbid obesity with body mass index (BMI) of 40.0 to 49.9 ([facility]) E66.01    Smoking history Z87.891    Status post total right knee replacement Z96.651    Hypotension I95.9    Syncope R55        IMPRESSION and Plan:    Rae Reid is a 50 y.o. female with   Patient Active Problem List    Diagnosis Date Noted    Syncope 07/09/2021    Status post total right knee replacement 07/08/2021    Hypotension 07/08/2021    Hypothyroid     Intestinal malabsorption     Anxiety     Athyroidism (acquired)     Morbid obesity ([address]     Morbid obesity with body mass index (BMI) of 40.0 to 49.9 ([address]     Smoking history     Status post gastric bypass for obesity 2005     Principal Problem:    Status post total right knee replacement (7/8/2021)    Active Problems: Morbid obesity ([address] ()      Hypotension (7/8/2021)      Syncope (7/9/2021)        unresponsiveness /near syncope /orthostatic hypotension  --- echo -- ef normal     Hypotension-improving  dc ivv and albumin        Morbid obesity        Continue PT/OT. Patient's condition is fair        Recommend to continue hospitalization. Discussed with patient. Chief Complaints: No chief complaint on file. weakness  SUBJECTIVE:  Pt is seen and examined. Chart reviewed. Feels weak. No further dizziness or syncopal episode      Review of systems:    Review of Systems   Constitutional: Positive for malaise/fatigue. HENT: Negative. Eyes: Negative. Respiratory: Positive for shortness of breath.     Cardiovascular: Positive for leg swelling. Negative for chest pain, palpitations and orthopnea. Gastrointestinal: Negative. Negative for abdominal pain, diarrhea and heartburn. Genitourinary: Negative for dysuria and hematuria. Skin: Negative. Neurological: Positive for weakness. Psychiatric/Behavioral: Negative for depression, substance abuse and suicidal ideas. The patient is not nervous/anxious. PE:  Patient Vitals for the past 24 hrs:   BP Temp Pulse Resp SpO2   07/10/21 0802 110/69 98.5 °F (36.9 °C) 85 16 100 %   07/10/21 0338 123/66 97.9 °F (36.6 °C) 78 18 100 %   07/09/21 2317 (!) 104/57 98.7 °F (37.1 °C) 90 18 100 %   07/09/21 2027 121/79       07/09/21 2025 131/77       07/09/21 1922 (!) 104/53 98.7 °F (37.1 °C) 99 18 100 %   07/09/21 1453 (!) 102/55 98.8 °F (37.1 °C) 90 18 100 %   07/09/21 1200  98.9 °F (37.2 °C)      07/09/21 1130 109/60  81 18 99 %       Intake/Output Summary (Last 24 hours) at 7/10/2021 1120  Last data filed at 7/10/2021 0021  Gross per 24 hour   Intake 1165 ml   Output 1250 ml   Net -85 ml     Patient Vitals for the past 120 hrs:   Weight   07/07/21 0745 129.9 kg (286 lb 6.4 oz)   07/07/21 2030 (P) 133.3 kg (293 lb 14 oz)   07/08/21 0854 129.7 kg (286 lb)         Physical Exam  Vitals and nursing note reviewed. Constitutional:       General: She is not in acute distress. Appearance: She is ill-appearing. Neck:      Vascular: No JVD. Cardiovascular:      Rate and Rhythm: Normal rate and regular rhythm. Heart sounds: Normal heart sounds. Pulmonary:      Effort: No respiratory distress. Breath sounds: Normal breath sounds. Abdominal:      General: Bowel sounds are normal. There is no distension. Palpations: Abdomen is soft. Tenderness: There is no abdominal tenderness. There is no rebound. Musculoskeletal:         General: Normal range of motion. Cervical back: Normal range of motion and neck supple. Skin:     General: Skin is warm and dry. Neurological:      Mental Status: She is alert and oriented to person, place, and time. Psychiatric:         Mood and Affect: Affect normal.             Intake and Output:  Current Shift:  No intake/output data recorded. Last three shifts:  07/08 1901 - 07/10 0700  In: 3855 [P.O.:480; I.V.:3375]  Out: 2550 [Urine:2550]    Lab/Data Reviewed:  No results found for this or any previous visit (from the past 8 hour(s)). Medications:  Current Facility-Administered Medications   Medication Dose Route Frequency    multivitamin, tx-iron-ca-min (THERA-M w/ IRON) tablet 1 Tablet  1 Tablet Oral DAILY    0.9% sodium chloride infusion  150 mL/hr IntraVENous CONTINUOUS    piperacillin-tazobactam (ZOSYN) 3.375 g in 0.9% sodium chloride (MBP/ADV) 100 mL MBP  3.375 g IntraVENous Q8H    albumin human 25% (BUMINATE) solution 25 g  25 g IntraVENous Q6H    [Held by provider] citalopram (CELEXA) tablet 10 mg  10 mg Oral DAILY    famotidine (PEPCID) tablet 40 mg  40 mg Oral DAILY    levothyroxine (SYNTHROID) tablet 125 mcg  125 mcg Oral ACB    pantoprazole (PROTONIX) tablet 40 mg  40 mg Oral ACB    sodium chloride (NS) flush 5-40 mL  5-40 mL IntraVENous Q8H    sodium chloride (NS) flush 5-40 mL  5-40 mL IntraVENous PRN    acetaminophen (TYLENOL) tablet 650 mg  650 mg Oral Q6H    [Held by provider] oxyCODONE IR (ROXICODONE) tablet 10 mg  10 mg Oral Q4H PRN    [Held by provider] HYDROmorphone (PF) (DILAUDID) injection 1 mg  1 mg IntraVENous Q4H PRN    [Held by provider] zolpidem (AMBIEN) tablet 5 mg  5 mg Oral QHS PRN    enoxaparin (LOVENOX) injection 40 mg  40 mg SubCUTAneous DAILY    oxyCODONE IR (ROXICODONE) tablet 5 mg  5 mg Oral Q6H PRN       No results found for this or any previous visit (from the past 24 hour(s)).     Procedures/imaging: see electronic medical records for all procedures/Xrays and details which were not copied into this note but were reviewed prior to creation of Tristan Saravia MD 7/10/2021, 11:20 AM

## 2021-07-10 NOTE — PROGRESS NOTES
Problem: Falls - Risk of  Goal: *Absence of Falls  Description: Document [place] Fall Risk and appropriate interventions in the flowsheet. Outcome: Progressing Towards Goal  Note: Fall Risk Interventions:  Mobility Interventions: Bed/chair exit alarm, Patient to call before getting OOB         Medication Interventions: Assess postural VS orthostatic hypotension, Bed/chair exit alarm, Patient to call before getting OOB    Elimination Interventions: Bed/chair exit alarm, Call light in reach, Patient to call for help with toileting needs              Problem: Patient Education: Go to Patient Education Activity  Goal: Patient/Family Education  Outcome: Progressing Towards Goal     Problem: Pressure Injury - Risk of  Goal: *Prevention of pressure injury  Description: Document Deng Scale and appropriate interventions in the flowsheet. Outcome: Progressing Towards Goal  Note: Pressure Injury Interventions:             Activity Interventions: Pressure redistribution bed/mattress(bed type), PT/OT evaluation, Assess need for specialty bed    Mobility Interventions: Assess need for specialty bed, HOB 30 degrees or less, PT/OT evaluation    Nutrition Interventions: Document food/fluid/supplement intake, Discuss nutritional consult with provider    Friction and Shear Interventions: Apply protective barrier, creams and emollients, Feet elevated on foot rest, HOB 30 degrees or less, Transferring/repositioning devices                Problem: Patient Education: Go to Patient Education Activity  Goal: Patient/Family Education  Outcome: Progressing Towards Goal

## 2021-07-10 NOTE — PROGRESS NOTES
Cardiology Progress Note        Patient: Naomie Montes        Sex: female          DOA: 7/7/2021  YOB: 1975      Age:  50 y.o.        LOS:  LOS: 3 days   Assessment/Plan     Principal Problem:    Status post total right knee replacement (7/8/2021)    Active Problems: Morbid obesity ([address] ()      Hypotension (7/8/2021)      Syncope (7/9/2021)        Plan:  Cardiac telemetry stable  No evidence of any arrhythmogenic etiology first presyncope/syncope  Patient have chronic low normal blood pressure  Now feeling much better and ambulated well with PT  I think etiology of presyncope/syncope was possible pain related our pain medication. Discussed with patient and family about adequate hydration. No further cardiac testing is suggested at this point. If patient remains asymptomatic from dizziness/presyncope/syncope standpoint can be discharged from cardiology standpoint. Subjective:    cc:  Syncope/presyncope        REVIEW OF SYSTEMS:     General: No fevers or chills. Cardiovascular: No chest pain or pressure. No palpitations. No ankle swelling  Pulmonary: No SOB, orthopnea, PND  Gastrointestinal: No nausea, vomiting or diarrhea      Objective:      Visit Vitals  /88   Pulse 82   Temp 98.8 °F (37.1 °C)   Resp 18   Ht 5' 5\" (1.651 m)   Wt 129.7 kg (286 lb)   SpO2 100%   BMI 47.59 kg/m²     Body mass index is 47.59 kg/m². Physical Exam:  General Appearance: Comfortable, not using accessory muscles of respiration. NECK: No JVD, no thyroidomeglay. LUNGS: Clear bilaterally. HEART: S1+S2 audible,    ABD: Non-tender, BS Audible    EXT: No edema, and no cysnosis. VASCULAR EXAM: Pulses are intact. PSYCHIATRIC EXAM: Mood is appropriate.     Medication:  Current Facility-Administered Medications   Medication Dose Route Frequency    multivitamin, tx-iron-ca-min (THERA-M w/ IRON) tablet 1 Tablet  1 Tablet Oral DAILY    piperacillin-tazobactam (ZOSYN) 3.375 g in 0.9% sodium chloride (MBP/ADV) 100 mL MBP  3.375 g IntraVENous Q8H    [Held by provider] citalopram (CELEXA) tablet 10 mg  10 mg Oral DAILY    famotidine (PEPCID) tablet 40 mg  40 mg Oral DAILY    levothyroxine (SYNTHROID) tablet 125 mcg  125 mcg Oral ACB    pantoprazole (PROTONIX) tablet 40 mg  40 mg Oral ACB    sodium chloride (NS) flush 5-40 mL  5-40 mL IntraVENous Q8H    sodium chloride (NS) flush 5-40 mL  5-40 mL IntraVENous PRN    acetaminophen (TYLENOL) tablet 650 mg  650 mg Oral Q6H    [Held by provider] oxyCODONE IR (ROXICODONE) tablet 10 mg  10 mg Oral Q4H PRN    [Held by provider] HYDROmorphone (PF) (DILAUDID) injection 1 mg  1 mg IntraVENous Q4H PRN    [Held by provider] zolpidem (AMBIEN) tablet 5 mg  5 mg Oral QHS PRN    enoxaparin (LOVENOX) injection 40 mg  40 mg SubCUTAneous DAILY    oxyCODONE IR (ROXICODONE) tablet 5 mg  5 mg Oral Q6H PRN               Lab/Data Reviewed:  Procedures/imaging: see electronic medical records for all procedures/Xrays   and details which were not copied into this note but were reviewed prior to creation of Plan       All lab results for the last 24 hours reviewed.      Recent Labs     07/08/21  0502 07/07/21  1845   WBC 12.6 14.3*   HGB 10.2* 10.8*   HCT 31.5* 32.9*    301     Recent Labs     07/09/21  0351 07/08/21  0502 07/07/21  2245    141 140   K 3.5 3.9 3.8   * 109 107   CO2 25 27 27   GLU 83 120* 143*   BUN 9 11 12   CREA 0.62 0.90 0.96   CA 7.8* 8.0* 8.3*       RADIOLOGY:  CT Results  (Last 48 hours)    None        CXR Results  (Last 48 hours)    None            Cardiology Procedures:   Results for orders placed or performed during the hospital encounter of 07/07/21   EKG, 12 LEAD, INITIAL   Result Value Ref Range    Ventricular Rate 64 BPM    Atrial Rate 64 BPM    P-R Interval 170 ms    QRS Duration 84 ms    Q-T Interval 420 ms    QTC Calculation (Bezet) 433 ms    Calculated P Axis 46 degrees    Calculated R Axis 44 degrees    Calculated T Axis 17 degrees    Diagnosis       Normal sinus rhythm  Nonspecific T wave abnormality  Abnormal ECG  When compared with ECG of 21-JUN-2021 13:36,  Nonspecific T wave abnormality now evident in Inferior leads  Inverted T waves have replaced nonspecific T wave abnormality in Anterior   leads  Confirmed by Satya Schaefer MD. ([id number]) on 7/7/2021 5:48:53 PM        Echo Results  (Last 48 hours)    None       Cardiolite (Tc-99m Sestamibi) stress test    Signed By: Brad Wasserman MD     July 10, 2021

## 2021-07-10 NOTE — PROGRESS NOTES
Problem: Mobility Impaired (Adult and Pediatric)  Goal: *Acute Goals and Plan of Care (Insert Text)  Description: In 1-7 days pt will be able to perform:  ST.  Bed mobility:  Rolling L to R to L modified independent for positioning. 2.  Supine to sit to supine S with HR for meals. 3.  Sit to stand to sit S with RW in prep for ambulation. LT.  Gait:  Ambulate >150ft S with RW, WBAT, for home/community mobility. 2.  Stair Negotiation:  Ascend/descend >10 steps CGA with HR for home entry. 3.  Activity tolerance: Tolerate up in chair 1-2 hours for ADL's.  4.  Patient/Family Education:  Patient/family to be independent with HEP for follow-up care and safe discharge. Outcome: Progressing Towards Goal   []  Patient has met MD lazara galeana for d/c home   []  Recommend HH with 24 hour adult care   [x]  Benefit from additional acute PT session to address:  stair negotiation    PHYSICAL THERAPY TREATMENT    Patient: Vazquez Horvath (72 y.o. female)  Date: 7/10/2021  Diagnosis: DJD (degenerative joint disease) of knee [M17.10] Status post total right knee replacement  Procedure(s) (LRB):  RIGHT TOTAL KNEE ARTHROPLASTY WITH CONFORMIS  **SPEC POP** (Right) 3 Days Post-Op  Precautions: Fall, WBAT  PLOF: has a rollator    ASSESSMENT:  Pt sitting EOB upon arrival.  No assistance needed for sit to stand. Ambulated with RW, 110ft, step to gt pattern, steady slow pace, no LOB or knee buckling. Returned to room and left sitting EOB. Progression toward goals:   []      Improving appropriately and progressing toward goals  [x]      Improving slowly and progressing toward goals  []      Not making progress toward goals and plan of care will be adjusted     PLAN:  Patient continues to benefit from skilled intervention to address the above impairments. Continue treatment per established plan of care.   Discharge Recommendations:  Home Health  Further Equipment Recommendations for Discharge:  rolling walker SUBJECTIVE:   Patient stated I feel ok.     OBJECTIVE DATA SUMMARY:   Critical Behavior:  Neurologic State: Alert  Orientation Level: Oriented X4  Cognition: Appropriate decision making  Safety/Judgement: Fall prevention  Functional Mobility Training:  Transfers:  Sit to Stand: Stand-by assistance  Stand to Sit: Supervision  Balance:  Sitting: Intact  Standing: Intact; With support  Standing - Static: Good  Standing - Dynamic : Fair   Ambulation/Gait Training:  Distance (ft): 110 Feet (ft)  Assistive Device: Gait belt;Walker, rolling  Ambulation - Level of Assistance: Stand-by assistance;Contact guard assistance  Gait Abnormalities: Antalgic; Step to gait  Right Side Weight Bearing: As tolerated  Speed/Jody: Slow        Pain:  Pain level pre-treatment: 5/10  Pain level post-treatment: 5/10   Pain Intervention(s): Medication (see MAR); Rest, Ice, Repositioning   Response to intervention: Nurse notified, See doc flow    Activity Tolerance:   Fair+  Please refer to the flowsheet for vital signs taken during this treatment. After treatment:   [] Patient left in no apparent distress sitting up in chair  [x] Patient left in no apparent distress in bed  [x] Call bell left within reach  [] Nursing notified  [] Caregiver present  [] Bed alarm activated  [] SCDs applied      COMMUNICATION/EDUCATION:   [x]         Role of Physical Therapy in the acute care setting. [x]         Fall prevention education was provided and the patient/caregiver indicated understanding. [x]         Patient/family have participated as able in working toward goals and plan of care. [x]         Patient/family agree to work toward stated goals and plan of care. []         Patient understands intent and goals of therapy, but is neutral about his/her participation.   []         Patient is unable to participate in stated goals/plan of care: ongoing with therapy staff.  []         Other:        Roula Mcfarlane PTA   Time Calculation: 15 mins

## 2021-07-10 NOTE — PROGRESS NOTES
Bedside and Verbal shift change report given to Moiz Redman RN (oncoming nurse) by Abner Fuller RN (offgoing nurse). Report included the following information SBAR, Kardex, ED Summary, OR Summary, Intake/Output, MAR, Recent Results, Med Rec Status and Cardiac Rhythm NSR.     1940 shift assessment completed. Patient is resting quietly with eyes open and chest rising and falling evenly. No signs of pain or discomfort.     0700 IVs in both places do not work. One was infiltrated and the other one is now leaking. Dayshift was notified so they could call to try and get one started. Bedside and Verbal shift change report given to Abner Fuller RN (oncoming nurse) by Moiz Redman RN (offgoing nurse). Report included the following information SBAR, Kardex, ED Summary, Intake/Output, MAR, Recent Results, Med Rec Status and Cardiac Rhythm NSR.

## 2021-07-10 NOTE — PROGRESS NOTES
Pharmacy Note: Zosyn    Pt was on extended infusion of Zosyn while in the intensive care unit. Prior order:  Zosyn 3.375 grams IV q8h (over 240 minutes). Indication: empiric treatment  Now that patient has been transferred to , Zosyn has been adjusted to standard intermittent dosing. Dose adjusted to:  Zosyn 3.375 grams IV q6h (infuse over 30 minutes)     Scr = 0.62    CrCl > 100  ml/min    Pharmacy to continue to monitor.      Liza Simental, PharmD   [phone]

## 2021-07-11 LAB
ALBUMIN SERPL-MCNC: 3.6 G/DL (ref 3.4–5)
ALBUMIN/GLOB SERPL: 1.5 {RATIO} (ref 0.8–1.7)
ALP SERPL-CCNC: 60 U/L (ref 45–117)
ALT SERPL-CCNC: 22 U/L (ref 13–56)
ANION GAP SERPL CALC-SCNC: 9 MMOL/L (ref 3–18)
AST SERPL-CCNC: 19 U/L (ref 10–38)
BASOPHILS # BLD: 0 K/UL (ref 0–0.1)
BASOPHILS # BLD: 0 K/UL (ref 0–0.1)
BASOPHILS NFR BLD: 0 % (ref 0–2)
BASOPHILS NFR BLD: 0 % (ref 0–2)
BILIRUB SERPL-MCNC: 0.6 MG/DL (ref 0.2–1)
BUN SERPL-MCNC: 7 MG/DL (ref 7–18)
BUN/CREAT SERPL: 10 (ref 12–20)
CALCIUM SERPL-MCNC: 8.6 MG/DL (ref 8.5–10.1)
CHLORIDE SERPL-SCNC: 106 MMOL/L (ref 100–111)
CO2 SERPL-SCNC: 27 MMOL/L (ref 21–32)
CREAT SERPL-MCNC: 0.68 MG/DL (ref 0.6–1.3)
DIFFERENTIAL METHOD BLD: ABNORMAL
DIFFERENTIAL METHOD BLD: ABNORMAL
EOSINOPHIL # BLD: 0.6 K/UL (ref 0–0.4)
EOSINOPHIL # BLD: 0.6 K/UL (ref 0–0.4)
EOSINOPHIL NFR BLD: 6 % (ref 0–5)
EOSINOPHIL NFR BLD: 6 % (ref 0–5)
ERYTHROCYTE [DISTWIDTH] IN BLOOD BY AUTOMATED COUNT: 15.9 % (ref 11.6–14.5)
ERYTHROCYTE [DISTWIDTH] IN BLOOD BY AUTOMATED COUNT: 16.1 % (ref 11.6–14.5)
FOLATE SERPL-MCNC: 7.7 NG/ML (ref 3.1–17.5)
GLOBULIN SER CALC-MCNC: 2.4 G/DL (ref 2–4)
GLUCOSE SERPL-MCNC: 87 MG/DL (ref 74–99)
HCT VFR BLD AUTO: 25.5 % (ref 35–45)
HCT VFR BLD AUTO: 28.1 % (ref 35–45)
HEMOCCULT STL QL: POSITIVE
HGB BLD-MCNC: 8.2 G/DL (ref 12–16)
HGB BLD-MCNC: 9 G/DL (ref 12–16)
IRON SATN MFR SERPL: 18 % (ref 20–50)
IRON SERPL-MCNC: 36 UG/DL (ref 50–175)
LYMPHOCYTES # BLD: 3.2 K/UL (ref 0.9–3.6)
LYMPHOCYTES # BLD: 3.7 K/UL (ref 0.9–3.6)
LYMPHOCYTES NFR BLD: 33 % (ref 21–52)
LYMPHOCYTES NFR BLD: 37 % (ref 21–52)
MAGNESIUM SERPL-MCNC: 2.1 MG/DL (ref 1.6–2.6)
MCH RBC QN AUTO: 30 PG (ref 24–34)
MCH RBC QN AUTO: 30.5 PG (ref 24–34)
MCHC RBC AUTO-ENTMCNC: 32 G/DL (ref 31–37)
MCHC RBC AUTO-ENTMCNC: 32.2 G/DL (ref 31–37)
MCV RBC AUTO: 93.4 FL (ref 74–97)
MCV RBC AUTO: 95.3 FL (ref 74–97)
MONOCYTES # BLD: 0.7 K/UL (ref 0.05–1.2)
MONOCYTES # BLD: 0.9 K/UL (ref 0.05–1.2)
MONOCYTES NFR BLD: 7 % (ref 3–10)
MONOCYTES NFR BLD: 9 % (ref 3–10)
NEUTS SEG # BLD: 4.9 K/UL (ref 1.8–8)
NEUTS SEG # BLD: 5.4 K/UL (ref 1.8–8)
NEUTS SEG NFR BLD: 48 % (ref 40–73)
NEUTS SEG NFR BLD: 54 % (ref 40–73)
PHOSPHATE SERPL-MCNC: 2.9 MG/DL (ref 2.5–4.9)
PLATELET # BLD AUTO: 261 K/UL (ref 135–420)
PLATELET # BLD AUTO: 286 K/UL (ref 135–420)
PMV BLD AUTO: 9 FL (ref 9.2–11.8)
PMV BLD AUTO: 9.3 FL (ref 9.2–11.8)
POTASSIUM SERPL-SCNC: 3.3 MMOL/L (ref 3.5–5.5)
PROT SERPL-MCNC: 6 G/DL (ref 6.4–8.2)
RBC # BLD AUTO: 2.73 M/UL (ref 4.2–5.3)
RBC # BLD AUTO: 2.95 M/UL (ref 4.2–5.3)
SODIUM SERPL-SCNC: 142 MMOL/L (ref 136–145)
TIBC SERPL-MCNC: 195 UG/DL (ref 250–450)
VIT B12 SERPL-MCNC: 179 PG/ML (ref 211–911)
WBC # BLD AUTO: 10.1 K/UL (ref 4.6–13.2)
WBC # BLD AUTO: 9.9 K/UL (ref 4.6–13.2)

## 2021-07-11 PROCEDURE — 74011250636 HC RX REV CODE- 250/636: Performed by: ORTHOPAEDIC SURGERY

## 2021-07-11 PROCEDURE — 74011250637 HC RX REV CODE- 250/637: Performed by: ORTHOPAEDIC SURGERY

## 2021-07-11 PROCEDURE — 83540 ASSAY OF IRON: CPT

## 2021-07-11 PROCEDURE — 74011250636 HC RX REV CODE- 250/636: Performed by: HOSPITALIST

## 2021-07-11 PROCEDURE — 84100 ASSAY OF PHOSPHORUS: CPT

## 2021-07-11 PROCEDURE — 83735 ASSAY OF MAGNESIUM: CPT

## 2021-07-11 PROCEDURE — 65660000000 HC RM CCU STEPDOWN

## 2021-07-11 PROCEDURE — 85025 COMPLETE CBC W/AUTO DIFF WBC: CPT

## 2021-07-11 PROCEDURE — 97110 THERAPEUTIC EXERCISES: CPT

## 2021-07-11 PROCEDURE — 80053 COMPREHEN METABOLIC PANEL: CPT

## 2021-07-11 PROCEDURE — 74011000258 HC RX REV CODE- 258: Performed by: HOSPITALIST

## 2021-07-11 PROCEDURE — 36415 COLL VENOUS BLD VENIPUNCTURE: CPT

## 2021-07-11 PROCEDURE — 97530 THERAPEUTIC ACTIVITIES: CPT

## 2021-07-11 PROCEDURE — 82607 VITAMIN B-12: CPT

## 2021-07-11 PROCEDURE — 97116 GAIT TRAINING THERAPY: CPT

## 2021-07-11 PROCEDURE — 82272 OCCULT BLD FECES 1-3 TESTS: CPT

## 2021-07-11 RX ADMIN — Medication 10 ML: at 12:00

## 2021-07-11 RX ADMIN — FAMOTIDINE 40 MG: 20 TABLET, FILM COATED ORAL at 09:06

## 2021-07-11 RX ADMIN — PIPERACILLIN AND TAZOBACTAM 3.38 G: 3; .375 INJECTION, POWDER, LYOPHILIZED, FOR SOLUTION INTRAVENOUS at 09:06

## 2021-07-11 RX ADMIN — ACETAMINOPHEN 650 MG: 325 TABLET, FILM COATED ORAL at 06:55

## 2021-07-11 RX ADMIN — Medication 10 ML: at 16:26

## 2021-07-11 RX ADMIN — PIPERACILLIN AND TAZOBACTAM 3.38 G: 3; .375 INJECTION, POWDER, LYOPHILIZED, FOR SOLUTION INTRAVENOUS at 20:45

## 2021-07-11 RX ADMIN — ACETAMINOPHEN 650 MG: 325 TABLET, FILM COATED ORAL at 17:20

## 2021-07-11 RX ADMIN — LEVOTHYROXINE SODIUM 125 MCG: 0.03 TABLET ORAL at 06:55

## 2021-07-11 RX ADMIN — PIPERACILLIN AND TAZOBACTAM 3.38 G: 3; .375 INJECTION, POWDER, LYOPHILIZED, FOR SOLUTION INTRAVENOUS at 04:01

## 2021-07-11 RX ADMIN — Medication 10 ML: at 23:32

## 2021-07-11 RX ADMIN — PANTOPRAZOLE SODIUM 40 MG: 40 TABLET, DELAYED RELEASE ORAL at 06:55

## 2021-07-11 RX ADMIN — ACETAMINOPHEN 650 MG: 325 TABLET, FILM COATED ORAL at 12:00

## 2021-07-11 RX ADMIN — PIPERACILLIN AND TAZOBACTAM 3.38 G: 3; .375 INJECTION, POWDER, LYOPHILIZED, FOR SOLUTION INTRAVENOUS at 16:25

## 2021-07-11 RX ADMIN — ENOXAPARIN SODIUM 40 MG: 40 INJECTION SUBCUTANEOUS at 02:08

## 2021-07-11 RX ADMIN — ACETAMINOPHEN 650 MG: 325 TABLET, FILM COATED ORAL at 02:08

## 2021-07-11 RX ADMIN — ACETAMINOPHEN 650 MG: 325 TABLET, FILM COATED ORAL at 23:32

## 2021-07-11 NOTE — PROGRESS NOTES
Problem: Pressure Injury - Risk of  Goal: *Prevention of pressure injury  Description: Document Deng Scale and appropriate interventions in the flowsheet. Outcome: Progressing Towards Goal  Note: Pressure Injury Interventions: Activity Interventions: Pressure redistribution bed/mattress(bed type)    Mobility Interventions: Pressure redistribution bed/mattress (bed type)    Nutrition Interventions: Document food/fluid/supplement intake    Friction and Shear Interventions: HOB 30 degrees or less                Problem: Patient Education: Go to Patient Education Activity  Goal: Patient/Family Education  Outcome: Progressing Towards Goal     Problem: Falls - Risk of  Goal: *Absence of Falls  Description: Document [place] Fall Risk and appropriate interventions in the flowsheet.   Outcome: Progressing Towards Goal  Note: Fall Risk Interventions:  Mobility Interventions: Patient to call before getting OOB    Mentation Interventions: Door open when patient unattended    Medication Interventions: Patient to call before getting OOB, Teach patient to arise slowly    Elimination Interventions: Call light in reach              Problem: Patient Education: Go to Patient Education Activity  Goal: Patient/Family Education  Outcome: Progressing Towards Goal     Problem: Syncope  Goal: *Absence of injury  Outcome: Progressing Towards Goal  Goal: Decrease or eliminate episodes of syncope  Outcome: Progressing Towards Goal

## 2021-07-11 NOTE — ROUTINE PROCESS
Bedside and Verbal shift change report given to Sparkle Martin RN (oncoming nurse) by Williams Finn RN (offgoing nurse). Report included the following information SBAR and Kardex.

## 2021-07-11 NOTE — PROGRESS NOTES
Problem: Mobility Impaired (Adult and Pediatric)  Goal: *Acute Goals and Plan of Care (Insert Text)  Description: In 1-7 days pt will be able to perform:  ST.  Bed mobility:  Rolling L to R to L modified independent for positioning. 2.  Supine to sit to supine S with HR for meals. 3.  Sit to stand to sit S with RW in prep for ambulation. LT.  Gait:  Ambulate >150ft S with RW, WBAT, for home/community mobility. 2.  Stair Negotiation:  Ascend/descend >10 steps CGA with HR for home entry. 3.  Activity tolerance: Tolerate up in chair 1-2 hours for ADL's.  4.  Patient/Family Education:  Patient/family to be independent with HEP for follow-up care and safe discharge. Outcome: Progressing Towards Goal  []  Patient has met MD mobilization critieria for d/c home   []  Recommend HH with 24 hour adult care   [x]  Benefit from additional acute PT session to address:  Functional mobility and ROM/motor performance deficits    PHYSICAL THERAPY TREATMENT    Patient: Rosalia Keane (78 y.o. female)  Date: 2021  Diagnosis: DJD (degenerative joint disease) of knee [M17.10] Status post total right knee replacement  Procedure(s) (LRB):  RIGHT TOTAL KNEE ARTHROPLASTY WITH CONFORMIS  **SPEC POP** (Right) 4 Days Post-Op  Precautions: Fall, WBAT  PLOF:  Independent mobility using SPC; has rollator only for home; lives in apt w/ 16 entry steps B rail    ASSESSMENT:  Pt supine in bed on arrival.  VS WNL supine, seated and standing during session (see doc Flow). Completes HEP supine with vc/tc and encouragement. Completes bed mobility tasks with SBA. No c/o light headedness seated. Transfers STS with CGA/SBA/RW. Gait with RW/CGA/SBA with very slow tuyet and vc/visual cues for increased heel strike on IC. Verbal cues to breathe during activities as well. Returned to room and left up in chair. Note pt tearful at start of session, c/o wanting to go home, but needing to stay for more tests.    Pt given encouragement and appears in better spirits at end of session. Progression toward goals:   [x]      Improving appropriately and progressing toward goals  []      Improving slowly and progressing toward goals  []      Not making progress toward goals and plan of care will be adjusted     PLAN:  Patient continues to benefit from skilled intervention to address the above impairments. Continue treatment per established plan of care. Discharge Recommendations:  Home Health  Further Equipment Recommendations for Discharge:  N/A     SUBJECTIVE:   Patient stated I'm good.     OBJECTIVE DATA SUMMARY:   Critical Behavior:  Neurologic State: Alert  Orientation Level: Oriented X4  Cognition: Appropriate decision making, Appropriate for age attention/concentration, Appropriate safety awareness, Follows commands  Safety/Judgement: Fall prevention  Functional Mobility Training:  Bed Mobility:  Supine to Sit: Stand-by assistance; Additional time  Scooting: Stand-by assistance  Transfers:  Sit to Stand: Contact guard assistance;Stand-by assistance  Stand to Sit: Stand-by assistance  Bed to Chair: Stand-by assistance  Balance:  Sitting: Intact  Standing: Intact; With support  Standing - Static: Good  Standing - Dynamic : Fair   Ambulation/Gait Training:  Distance (ft): 110 Feet (ft)  Assistive Device: Gait belt;Walker, rolling  Ambulation - Level of Assistance: Contact guard assistance;Stand-by assistance; Additional time (vc for Heel strike at IC)  Gait Abnormalities: Decreased step clearance; Antalgic; Step to gait  Right Side Weight Bearing: As tolerated  Speed/Jody: Slow  Step Length: Left shortened;Right shortened  Interventions: Safety awareness training; Tactile cues; Verbal cues; Visual/Demos  Therapeutic Exercises:       EXERCISE   Sets   Reps   Active Active Assist   Passive Self ROM   Comments   Ankle Pumps 1 30  [x] [] [] []    Quad Sets/Glut Sets 1 10  [x] [] [] [] Hold for 5 secs   Hamstring Sets   [] [] [] []    Short Arc Quads   [] [] [] []    Heel Slides 2 10 [x] [x] [] []    Straight Leg Raises   [] [] [] []    Hip Add   [] [] [] [] Hold for 5 secs, w/ pillow squeeze   Long Arc Quads 1 10 [] [] [] []    Seated Marching   [] [] [] []    Standing Marching   [] [] [] []       [] [] [] []        Pain:  Pain level pre-treatment: 5/10  Pain level post-treatment: 6/10   Pain Intervention(s): Medication (see MAR); Rest, Ice, Repositioning   Response to intervention: Nurse notified, See doc flow  Activity Tolerance:   Fair : see Doc Flow for VS details  Please refer to the flowsheet for vital signs taken during this treatment. After treatment:   [x] Patient left in no apparent distress sitting up in chair  [] Patient left in no apparent distress in bed  [x] Call bell left within reach  [x] Nursing notified  [] Caregiver present  [] Bed alarm activated  [] SCDs applied      COMMUNICATION/EDUCATION:   [x]         Role of Physical Therapy in the acute care setting. [x]         Fall prevention education was provided and the patient/caregiver indicated understanding. [x]         Patient/family have participated as able in working toward goals and plan of care. [x]         Patient/family agree to work toward stated goals and plan of care. []         Patient understands intent and goals of therapy, but is neutral about his/her participation.   []         Patient is unable to participate in stated goals/plan of care: ongoing with therapy staff.  []         Other:        Kristopher Álvarez, PT   Time Calculation: 54 mins

## 2021-07-11 NOTE — PROGRESS NOTES
Cardiology Progress Note        Patient: Annemarie Warren        Sex: female          DOA: 7/7/2021  YOB: 1975      Age:  50 y.o.        LOS:  LOS: 4 days   Assessment/Plan     Principal Problem:    Status post total right knee replacement (7/8/2021)    Active Problems: Morbid obesity ([address] ()      Hypotension (7/8/2021)      Syncope (7/9/2021)        Plan:  Patient denied any symptoms of presyncope and syncope. Patient ambulated without any problem. Blood pressure remained stable. Cardiac telemetry without any arrhythmia. Waiting for repeat hemoglobin. Discussed with patient and family. Cardiac telemetry stable  No evidence of any arrhythmogenic etiology first presyncope/syncope  Patient have chronic low normal blood pressure  Now feeling much better and ambulated well with PT  I think etiology of presyncope/syncope was possible pain related our pain medication. Discussed with patient and family about adequate hydration. No further cardiac testing is suggested at this point. If patient remains asymptomatic from dizziness/presyncope/syncope standpoint can be discharged from cardiology standpoint. Subjective:    cc:  Syncope/presyncope        REVIEW OF SYSTEMS:     General: No fevers or chills. Cardiovascular: No chest pain or pressure. No palpitations. No ankle swelling  Pulmonary: No SOB, orthopnea, PND  Gastrointestinal: No nausea, vomiting or diarrhea      Objective:      Visit Vitals  /82 (BP 1 Location: Right upper arm, BP Patient Position: Sitting)   Pulse 76   Temp 98.5 °F (36.9 °C)   Resp 16   Ht 5' 5\" (1.651 m)   Wt 129.7 kg (286 lb)   SpO2 100%   BMI 47.59 kg/m²     Body mass index is 47.59 kg/m². Physical Exam:  General Appearance: Comfortable, not using accessory muscles of respiration. NECK: No JVD, no thyroidomeglay. LUNGS: Clear bilaterally.    HEART: S1+S2 audible,    ABD: Non-tender, BS Audible    EXT: No edema, and no cysnosis on left side. Right knee surgery with mild right lower extremity edema. VASCULAR EXAM: Pulses are intact. PSYCHIATRIC EXAM: Mood is appropriate. Medication:  Current Facility-Administered Medications   Medication Dose Route Frequency    piperacillin-tazobactam (ZOSYN) 3.375 g in 0.9% sodium chloride (MBP/ADV) 100 mL MBP  3.375 g IntraVENous Q6H    multivitamin, tx-iron-ca-min (THERA-M w/ IRON) tablet 1 Tablet  1 Tablet Oral DAILY    [Held by provider] citalopram (CELEXA) tablet 10 mg  10 mg Oral DAILY    famotidine (PEPCID) tablet 40 mg  40 mg Oral DAILY    levothyroxine (SYNTHROID) tablet 125 mcg  125 mcg Oral ACB    pantoprazole (PROTONIX) tablet 40 mg  40 mg Oral ACB    sodium chloride (NS) flush 5-40 mL  5-40 mL IntraVENous Q8H    sodium chloride (NS) flush 5-40 mL  5-40 mL IntraVENous PRN    acetaminophen (TYLENOL) tablet 650 mg  650 mg Oral Q6H    [Held by provider] oxyCODONE IR (ROXICODONE) tablet 10 mg  10 mg Oral Q4H PRN    [Held by provider] HYDROmorphone (PF) (DILAUDID) injection 1 mg  1 mg IntraVENous Q4H PRN    [Held by provider] zolpidem (AMBIEN) tablet 5 mg  5 mg Oral QHS PRN    enoxaparin (LOVENOX) injection 40 mg  40 mg SubCUTAneous DAILY    oxyCODONE IR (ROXICODONE) tablet 5 mg  5 mg Oral Q6H PRN               Lab/Data Reviewed:  Procedures/imaging: see electronic medical records for all procedures/Xrays   and details which were not copied into this note but were reviewed prior to creation of Plan       All lab results for the last 24 hours reviewed.      Recent Labs     07/11/21  0215   WBC 10.1   HGB 8.2*   HCT 25.5*        Recent Labs     07/11/21  0215 07/09/21  0351    145   K 3.3* 3.5    116*   CO2 27 25   GLU 87 83   BUN 7 9   CREA 0.68 0.62   CA 8.6 7.8*       RADIOLOGY:  CT Results  (Last 48 hours)    None        CXR Results  (Last 48 hours)    None            Cardiology Procedures:   Results for orders placed or performed during the hospital encounter of 07/07/21   EKG, 12 LEAD, INITIAL   Result Value Ref Range    Ventricular Rate 64 BPM    Atrial Rate 64 BPM    P-R Interval 170 ms    QRS Duration 84 ms    Q-T Interval 420 ms    QTC Calculation (Bezet) 433 ms    Calculated P Axis 46 degrees    Calculated R Axis 44 degrees    Calculated T Axis 17 degrees    Diagnosis       Normal sinus rhythm  Nonspecific T wave abnormality  Abnormal ECG  When compared with ECG of 21-JUN-2021 13:36,  Nonspecific T wave abnormality now evident in Inferior leads  Inverted T waves have replaced nonspecific T wave abnormality in Anterior   leads  Confirmed by Moraima Parkinson MD. ([id number] on 7/7/2021 5:48:53 PM        Echo Results  (Last 48 hours)    None       Cardiolite (Tc-99m Sestamibi) stress test    Signed By: Rosalie Mclaughlin MD     July 11, 2021

## 2021-07-11 NOTE — PROGRESS NOTES
Problem: Mobility Impaired (Adult and Pediatric)  Goal: *Acute Goals and Plan of Care (Insert Text)  Description: In 1-7 days pt will be able to perform:  ST.  Bed mobility:  Rolling L to R to L modified independent for positioning. 2.  Supine to sit to supine S with HR for meals. 3.  Sit to stand to sit S with RW in prep for ambulation. LT.  Gait:  Ambulate >150ft S with RW, WBAT, for home/community mobility. 2.  Stair Negotiation:  Ascend/descend >10 steps CGA with HR for home entry. 3.  Activity tolerance: Tolerate up in chair 1-2 hours for ADL's.  4.  Patient/Family Education:  Patient/family to be independent with HEP for follow-up care and safe discharge. Outcome: Progressing Towards Goal     []  Patient has met MD mobilization kervin for d/c home   []  Recommend HH with 24 hour adult care   [x]  Benefit from additional acute PT session to address:functional mobility and ROM/motor performance R LE        PHYSICAL THERAPY TREATMENT    Patient: Laney Moore (17 y.o. female)  Date: 2021  Diagnosis: DJD (degenerative joint disease) of knee [M17.10] Status post total right knee replacement  Procedure(s) (LRB):  RIGHT TOTAL KNEE ARTHROPLASTY WITH CONFORMIS  **SPEC POP** (Right) 4 Days Post-Op  Precautions: Fall, WBAT  PLOF: independent with SPC    ASSESSMENT:  Pt sleeping, but easily awakened on PT arrival. Pt daughter in room. Completed HEP as noted below with vc/AA as noted. Lunch tray present, but pt declines eating at this time. Will continue to begin step nego tomorrow. Progression toward goals:   [x]      Improving appropriately and progressing toward goals  []      Improving slowly and progressing toward goals  []      Not making progress toward goals and plan of care will be adjusted     PLAN:  Patient continues to benefit from skilled intervention to address the above impairments. Continue treatment per established plan of care.   Discharge Recommendations:  Home Physical Therapy  Further Equipment Recommendations for Discharge:  N/A     SUBJECTIVE:   Patient stated I'm okay.     OBJECTIVE DATA SUMMARY:   Critical Behavior:  Neurologic State: Alert  Orientation Level: Oriented X4  Cognition: Appropriate decision making, Appropriate for age attention/concentration, Appropriate safety awareness  Safety/Judgement: Fall prevention  Therapeutic Exercises:        EXERCISE   Sets   Reps   Active Active Assist   Passive Self ROM   Comments   Ankle Pumps 1 30  [x] []  []  []      Quad Sets/Glut Sets 1 15  [x] []  []  []  Hold for 5 secs   Ankle Circles  1 10/10  [x]  []  []  []      Short Arc Quads     []  []  []  []      Heel Slides     []  []  []  []      Straight Leg Raises     []  []  []  []      Hip Add 1  15  [x]  [x]  []  []  Hold for 5 secs, w/ pillow squeeze   Long Arc Quads   []  []  []  []      Seated Marching     []  []  []  []      Standing Marching     []  []  []  []            []  []  []  []          Pain:  Pain level pre-treatment: 6/10  Pain level post-treatment: 6/10   Pain Intervention(s): Medication (see MAR); Rest, Ice, Repositioning   Response to intervention: Nurse notified, See doc flow    Activity Tolerance:   Good   Please refer to the flowsheet for vital signs taken during this treatment. After treatment:   [] Patient left in no apparent distress sitting up in chair  [x] Patient left in no apparent distress in bed  [] Call bell left within reach  [] Nursing notified  [x] Caregiver present-daughter  [] Bed alarm activated  [] SCDs applied      COMMUNICATION/EDUCATION:   [x]         Role of Physical Therapy in the acute care setting. [x]         Fall prevention education was provided and the patient/caregiver indicated understanding. [x]         Patient/family have participated as able in working toward goals and plan of care. [x]         Patient/family agree to work toward stated goals and plan of care.   []         Patient understands intent and goals of therapy, but is neutral about his/her participation.   []         Patient is unable to participate in stated goals/plan of care: ongoing with therapy staff.  []         Other:        Mark Hollis, PT   Time Calculation: 54 mins

## 2021-07-11 NOTE — PROGRESS NOTES
Problem: Falls - Risk of  Goal: *Absence of Falls  Description: Document Abel Vidales Fall Risk and appropriate interventions in the flowsheet. Outcome: Progressing Towards Goal  Note: Fall Risk Interventions:  Mobility Interventions: Patient to call before getting OOB, Utilize walker, cane, or other assistive device, OT consult for ADLs    Mentation Interventions: Adequate sleep, hydration, pain control    Medication Interventions: Assess postural VS orthostatic hypotension, Patient to call before getting OOB, Teach patient to arise slowly    Elimination Interventions: Call light in reach, Patient to call for help with toileting needs              Problem: Patient Education: Go to Patient Education Activity  Goal: Patient/Family Education  Outcome: Progressing Towards Goal     Problem: Pressure Injury - Risk of  Goal: *Prevention of pressure injury  Description: Document Deng Scale and appropriate interventions in the flowsheet. Outcome: Progressing Towards Goal  Note: Pressure Injury Interventions:             Activity Interventions: Assess need for specialty bed, Pressure redistribution bed/mattress(bed type)    Mobility Interventions: Assess need for specialty bed, HOB 30 degrees or less, Suspension boots    Nutrition Interventions: Document food/fluid/supplement intake, Offer support with meals,snacks and hydration    Friction and Shear Interventions: Apply protective barrier, creams and emollients, HOB 30 degrees or less                Problem: Patient Education: Go to Patient Education Activity  Goal: Patient/Family Education  Outcome: Progressing Towards Goal

## 2021-07-12 VITALS
WEIGHT: 293 LBS | TEMPERATURE: 98 F | BODY MASS INDEX: 48.82 KG/M2 | RESPIRATION RATE: 16 BRPM | DIASTOLIC BLOOD PRESSURE: 86 MMHG | HEIGHT: 65 IN | OXYGEN SATURATION: 100 % | SYSTOLIC BLOOD PRESSURE: 138 MMHG | HEART RATE: 86 BPM

## 2021-07-12 LAB
ALBUMIN SERPL-MCNC: 3.6 G/DL (ref 3.4–5)
ALBUMIN/GLOB SERPL: 1.3 {RATIO} (ref 0.8–1.7)
ALP SERPL-CCNC: 65 U/L (ref 45–117)
ALT SERPL-CCNC: 23 U/L (ref 13–56)
ANION GAP SERPL CALC-SCNC: 6 MMOL/L (ref 3–18)
AST SERPL-CCNC: 19 U/L (ref 10–38)
ATRIAL RATE: 74 BPM
BASOPHILS # BLD: 0.1 K/UL (ref 0–0.1)
BASOPHILS NFR BLD: 1 % (ref 0–2)
BILIRUB SERPL-MCNC: 0.7 MG/DL (ref 0.2–1)
BUN SERPL-MCNC: 7 MG/DL (ref 7–18)
BUN/CREAT SERPL: 9 (ref 12–20)
CALCIUM SERPL-MCNC: 8.9 MG/DL (ref 8.5–10.1)
CALCULATED P AXIS, ECG09: 62 DEGREES
CALCULATED R AXIS, ECG10: 84 DEGREES
CALCULATED T AXIS, ECG11: 32 DEGREES
CHLORIDE SERPL-SCNC: 106 MMOL/L (ref 100–111)
CO2 SERPL-SCNC: 29 MMOL/L (ref 21–32)
CREAT SERPL-MCNC: 0.82 MG/DL (ref 0.6–1.3)
DIAGNOSIS, 93000: NORMAL
DIFFERENTIAL METHOD BLD: ABNORMAL
EOSINOPHIL # BLD: 0.7 K/UL (ref 0–0.4)
EOSINOPHIL NFR BLD: 7 % (ref 0–5)
ERYTHROCYTE [DISTWIDTH] IN BLOOD BY AUTOMATED COUNT: 15.9 % (ref 11.6–14.5)
GLOBULIN SER CALC-MCNC: 2.7 G/DL (ref 2–4)
GLUCOSE SERPL-MCNC: 119 MG/DL (ref 74–99)
HCT VFR BLD AUTO: 27.2 % (ref 35–45)
HGB BLD-MCNC: 9.1 G/DL (ref 12–16)
LYMPHOCYTES # BLD: 3.1 K/UL (ref 0.9–3.6)
LYMPHOCYTES NFR BLD: 32 % (ref 21–52)
MAGNESIUM SERPL-MCNC: 2.2 MG/DL (ref 1.6–2.6)
MCH RBC QN AUTO: 31.2 PG (ref 24–34)
MCHC RBC AUTO-ENTMCNC: 33.5 G/DL (ref 31–37)
MCV RBC AUTO: 93.2 FL (ref 74–97)
MONOCYTES # BLD: 0.8 K/UL (ref 0.05–1.2)
MONOCYTES NFR BLD: 8 % (ref 3–10)
NEUTS SEG # BLD: 5.1 K/UL (ref 1.8–8)
NEUTS SEG NFR BLD: 52 % (ref 40–73)
P-R INTERVAL, ECG05: 180 MS
PHOSPHATE SERPL-MCNC: 3.1 MG/DL (ref 2.5–4.9)
PLATELET # BLD AUTO: 289 K/UL (ref 135–420)
PMV BLD AUTO: 9.2 FL (ref 9.2–11.8)
POTASSIUM SERPL-SCNC: 3.1 MMOL/L (ref 3.5–5.5)
PROT SERPL-MCNC: 6.3 G/DL (ref 6.4–8.2)
Q-T INTERVAL, ECG07: 398 MS
QRS DURATION, ECG06: 88 MS
QTC CALCULATION (BEZET), ECG08: 441 MS
RBC # BLD AUTO: 2.92 M/UL (ref 4.2–5.3)
SODIUM SERPL-SCNC: 141 MMOL/L (ref 136–145)
VENTRICULAR RATE, ECG03: 74 BPM
WBC # BLD AUTO: 9.8 K/UL (ref 4.6–13.2)

## 2021-07-12 PROCEDURE — 83735 ASSAY OF MAGNESIUM: CPT

## 2021-07-12 PROCEDURE — 74011250636 HC RX REV CODE- 250/636: Performed by: HOSPITALIST

## 2021-07-12 PROCEDURE — 84100 ASSAY OF PHOSPHORUS: CPT

## 2021-07-12 PROCEDURE — 74011250637 HC RX REV CODE- 250/637: Performed by: ORTHOPAEDIC SURGERY

## 2021-07-12 PROCEDURE — 97110 THERAPEUTIC EXERCISES: CPT

## 2021-07-12 PROCEDURE — 97116 GAIT TRAINING THERAPY: CPT

## 2021-07-12 PROCEDURE — 80053 COMPREHEN METABOLIC PANEL: CPT

## 2021-07-12 PROCEDURE — 36415 COLL VENOUS BLD VENIPUNCTURE: CPT

## 2021-07-12 PROCEDURE — 85025 COMPLETE CBC W/AUTO DIFF WBC: CPT

## 2021-07-12 PROCEDURE — 74011250637 HC RX REV CODE- 250/637: Performed by: HOSPITALIST

## 2021-07-12 PROCEDURE — 74011000258 HC RX REV CODE- 258: Performed by: HOSPITALIST

## 2021-07-12 RX ORDER — POTASSIUM CHLORIDE 20 MEQ/1
40 TABLET, EXTENDED RELEASE ORAL
Status: COMPLETED | OUTPATIENT
Start: 2021-07-12 | End: 2021-07-12

## 2021-07-12 RX ADMIN — Medication 10 ML: at 05:21

## 2021-07-12 RX ADMIN — PANTOPRAZOLE SODIUM 40 MG: 40 TABLET, DELAYED RELEASE ORAL at 10:17

## 2021-07-12 RX ADMIN — PIPERACILLIN AND TAZOBACTAM 3.38 G: 3; .375 INJECTION, POWDER, LYOPHILIZED, FOR SOLUTION INTRAVENOUS at 10:17

## 2021-07-12 RX ADMIN — FAMOTIDINE 40 MG: 20 TABLET, FILM COATED ORAL at 10:18

## 2021-07-12 RX ADMIN — ACETAMINOPHEN 650 MG: 325 TABLET, FILM COATED ORAL at 05:20

## 2021-07-12 RX ADMIN — POTASSIUM CHLORIDE 40 MEQ: 20 TABLET, EXTENDED RELEASE ORAL at 10:17

## 2021-07-12 RX ADMIN — PIPERACILLIN AND TAZOBACTAM 3.38 G: 3; .375 INJECTION, POWDER, LYOPHILIZED, FOR SOLUTION INTRAVENOUS at 03:03

## 2021-07-12 RX ADMIN — ACETAMINOPHEN 650 MG: 325 TABLET, FILM COATED ORAL at 12:27

## 2021-07-12 RX ADMIN — Medication 1 TABLET: at 10:17

## 2021-07-12 RX ADMIN — LEVOTHYROXINE SODIUM 125 MCG: 0.03 TABLET ORAL at 10:18

## 2021-07-12 NOTE — PROGRESS NOTES
Discharge Planning: Home with home health, family assist and MD follow-up    CM spoke with the patient at the bedside regarding plans for discharge today. The patient states that a family member will be available to pick her up upon discharge. The patient denies any additional questions or concerns at this time. CM to follow the patient's progress and be available to assist with discharge planning as needed. CMS referral placed. 1335: CM spoke with the patient who states that she has a rollator at home, not a rolling walker. Therapy is recommending a rolling walker. This CM to send the order for the rolling walker to [facility]  and they will have the walker delivered to the patient's home. The patient verbalizes agreement and states that if this does not work out she will have her children purchase a rolling walker from the drugstore for her. Care Management Interventions  PCP Verified by CM: Yes  Palliative Care Criteria Met (RRAT>21 & CHF Dx)?: No  Mode of Transport at Discharge: Other (see comment) (family)  Transition of Care Consult (CM Consult): [address]: No  Reason Outside [place]: Physician referred to specific agency ([facility])  Physical Therapy Consult: Yes  Occupational Therapy Consult: Yes  Current Support Network:  Other  Confirm Follow Up Transport: Family  The Patient and/or Patient Representative was Provided with a Choice of Provider and Agrees with the Discharge Plan?: Yes  Freedom of Choice List was Provided with Basic Dialogue that Supports the Patient's Individualized Plan of Care/Goals, Treatment Preferences and Shares the Quality Data Associated with the Providers?: Yes  Discharge Location  Discharge Placement: Home with home health

## 2021-07-12 NOTE — DISCHARGE INSTRUCTIONS
Patient Education        Fainting: Care Instructions  Your Care Instructions     When you faint, or pass out, you lose consciousness for a short time. A brief drop in blood flow to the brain often causes it. When you fall or lie down, more blood flows to your brain and you regain consciousness. Emotional stress, pain, or overheating--especially if you have been standing--can make you faint. In these cases, fainting is usually not serious. But fainting can be a sign of a more serious problem. Your doctor may want you to have more tests to rule out other causes. The treatment you need depends on the reason why you fainted. The doctor has checked you carefully, but problems can develop later. If you notice any problems or new symptoms, get medical treatment right away. Follow-up care is a key part of your treatment and safety. Be sure to make and go to all appointments, and call your doctor if you are having problems. It's also a good idea to know your test results and keep a list of the medicines you take. How can you care for yourself at home? · Drink plenty of fluids to prevent dehydration. If you have kidney, heart, or liver disease and have to limit fluids, talk with your doctor before you increase your fluid intake. When should you call for help? Call 911 anytime you think you may need emergency care. For example, call if:    · You have symptoms of a heart problem. These may include:  ? Chest pain or pressure. ? Severe trouble breathing. ? A fast or irregular heartbeat. ? Lightheadedness or sudden weakness. ? Coughing up pink, foamy mucus. ? Passing out. After you call 911, the  may tell you to chew 1 adult-strength or 2 to 4 low-dose aspirin. Wait for an ambulance. Do not try to drive yourself.     · You have symptoms of a stroke. These may include:  ? Sudden numbness, tingling, weakness, or loss of movement in your face, arm, or leg, especially on only one side of your body.   ? Sudden vision changes. ? Sudden trouble speaking. ? Sudden confusion or trouble understanding simple statements. ? Sudden problems with walking or balance. ? A sudden, severe headache that is different from past headaches.     · You passed out (lost consciousness) again. Watch closely for changes in your health, and be sure to contact your doctor if:    · You do not get better as expected. Where can you learn more? Go to http://www.gray.com/  Enter A848 in the search box to learn more about \"Fainting: Care Instructions. \"  Current as of: February 26, 2020               Content Version: 12.8  © 7919-5111 [email or web address] / [employer]. Care instructions adapted under license by [employer] (which disclaims liability or warranty for this information). If you have questions about a medical condition or this instruction, always ask your healthcare professional. [address] any warranty or liability for your use of this information. Dr. Governor Peters Operative Instructions Total Knee Replacement    ACTIVITIES :  1. You may be up and walking about the house with your walker. 2.  Activities around the house, such as washing dishes, fixing light meals, and your own personal care are fine. 3.  Avoid strenuous activities, such as vacuuming, lifting laundry or grocery bags. 4.  Walking is the best way to rebuild strength and stamina. Start SLOWLY and gradually increase your distance. 5.  Avoid any jogging, running or excessive stair-climbing   6. Your home physical therapist will work with you and your range-of-motion for the first 7-14 days. After your first visit with Dr. Demetrice Gibson you will be scheduled for out-patient physical therapy at a site convenient for you. 7.  Follow-up with Dr. Demetrice Gibson in 10-14 days. BATHING and INCISION CARE:  1. Do not remove bandage until first post-op visit in office  2.  The incision may be tender to touch or feel numb: this is normal.   3.  Keep the incision clean. . The incision will be closed with sutures under the skin and the skin will be glued. 4.  Do not apply any lotions, ointments or oils on the incision. 5.  If you notice any excessive swelling, redness, or persistent drainage around the incision, notify the office immediately. DRIVIN. You should not drive until after your follow-up appointment. 2.  You can be in a vehicle for short distances, but if you travel any long distance, please stop about every 30 minutes and walk/stretch. 3.  You should NEVER drive while taking narcotic medication. 4.  Driving will be permitted on right knee replacements after the therapist has confirmed a range-of-motion of a 105 degrees. Left knee replacements may drive at 2 weeks post-op. RETURN TO WORK :  1. The decision to return to work will be determined on an individual basis. 2.  Many people who have a strenuous job (construction, heavy labor, etc) may need to be off work for up to 12 weeks. 3.  If you need a work note, please let us know as soon as possible, and not the same day you are planning to return to work. NUTRITION :  1.  Good nutrition is an essential part of healing. 2.  You should eat a balanced diet each day, including fruits, vegetables, dairy products and protein. 3.  Remember to drink plenty of water. 4.  If you have not had a bowel movement within 3 days of surgery, you will need to use a laxative or suppository that can be obtained over-the-counter at your local pharmacy. MEDICATIONS -  1. You may resume the medications you were taking before surgery. 2.  You will receive a prescription for pain medication at discharge from the hospital. The pain medication works best if taken before the pain becomes severe. 3.  To reduce stomach upset, always take the medication with food.    4.  Begin to wean yourself off the pain medication during the second week after discharge. 5.  If you need a refill, please call the office during working hours at least 2 days before your prescription runs out. Do not wait until your bottle is empty to call for a refill. 6.  You will also be prescribed a blood thinner that you will take by injection for 21 days post-operatively. 7.  DO NOT drive if you are taking narcotic pain medications. HOME HEALTH CARE:  1.   A home health care service has been set-up for you to help assist you once you leave the hospital.  2.  They will contact you either before you leave the hospital or within 24 hours once you have been discharged home. 3. A nurse will assist you with your dressing changes and a Physical Therapist with help you with your therapy needs. CALL THE OFFICE:   If you have severe pain unrelieved by the medications;   If you have a fever of 101.0°F or greater;    If you notice excessive swelling, redness, or persistent drainage from the incision or IV site; [facility] office number is [phone] from 8:00am to 5:00pm Monday through Friday. After 5:00pm, on weekends, or holidays, please leave a message with our answering service and the doctor on-call will get back to you shortly.

## 2021-07-12 NOTE — PROGRESS NOTES
Problem: Mobility Impaired (Adult and Pediatric)  Goal: *Acute Goals and Plan of Care (Insert Text)  Description: In 1-7 days pt will be able to perform:  ST.  Bed mobility:  Rolling L to R to L modified independent for positioning. 2.  Supine to sit to supine S with HR for meals. 3.  Sit to stand to sit S with RW in prep for ambulation. LT.  Gait:  Ambulate >150ft S with RW, WBAT, for home/community mobility. 2.  Stair Negotiation:  Ascend/descend >10 steps CGA with HR for home entry. 3.  Activity tolerance: Tolerate up in chair 1-2 hours for ADL's.  4.  Patient/Family Education:  Patient/family to be independent with HEP for follow-up care and safe discharge. Outcome: Progressing Towards Goal   PHYSICAL THERAPY TREATMENT    Patient: Nino So (69 y.o. female)  Date: 2021  Diagnosis: DJD (degenerative joint disease) of knee [M17.10] Status post total right knee replacement  Procedure(s) (LRB):  RIGHT TOTAL KNEE ARTHROPLASTY WITH CONFORMIS  **SPEC POP** (Right) 5 Days Post-Op  Precautions: Fall, WBAT  ASSESSMENT:  Encountered patient ambulating to bathroom. Supervision for balance in bathroom. Amb 120ft with ww and supervision. Completed 16 steps with supervision, bilateral handrails and additional time. Seated in chair at end of session. Educated on need for RN assistance with mobility; verbalized understanding. Call bell in reach. Patient is cleared by PT for discharge to home. Progression toward goals:   [x]      Improving appropriately and progressing toward goals  []      Improving slowly and progressing toward goals  []      Not making progress toward goals and plan of care will be adjusted     PLAN:  Patient continues to benefit from skilled intervention to address the above impairments. Continue treatment per established plan of care.   Discharge Recommendations:  Home Health  Further Equipment Recommendations for Discharge:  rolling walker     SUBJECTIVE:   Patient stated I have my son.     OBJECTIVE DATA SUMMARY:   Critical Behavior:  Neurologic State: Alert  Orientation Level: Oriented X4  Cognition: Follows commands     Psychosocial  Patient Behaviors: Cooperative  Functional Mobility:  Transfers:  Sit to Stand: Supervision  Stand to Sit: Supervision  Balance:   Sitting: Intact  Standing: Impaired  Standing - Static: Good  Standing - Dynamic : Good  Ambulation/Gait Training:  Distance (ft): 120 Feet (ft)   Assistive Device: Walker, rolling  Ambulation - Level of Assistance: Supervision  Stairs:   Level of Assistance: Supervision  Assistive Device: rolling walker as handrails  Rail Use: both  Number of Stairs: 16    Pain:  Pain level pre-treatment: 7/10  Pain level post-treatment: 7/10     Activity Tolerance:   Good    After treatment:   [x] Patient left in no apparent distress sitting up in chair  [] Patient left in no apparent distress in bed  [x] Call bell left within reach  [x] Nursing notified  [] Caregiver present  [] Bed alarm activated  [] SCDs applied      COMMUNICATION/EDUCATION:   [x]         Role of physical therapy in the acute care setting. [x]         Fall prevention education was provided and the patient/caregiver indicated understanding. [x]         Patient/family have participated as able in working toward goals and plan of care. [x]         Patient/family agree to work toward stated goals and plan of care. []         Patient understands intent and goals of therapy, but is neutral about his/her participation. []         Patient is unable to participate in stated goals/plan of care: ongoing with therapy staff.       Paul Perez, PT   Time Calculation: 38 mins

## 2021-07-12 NOTE — PROGRESS NOTES
Assumed pt care approx 0730. Assessment performed, see flowsheet. Pt a&o x4, very pleasant and asking if she will be dc'd home today. Currently no orders to dc home at this time, will keep pt updated. Pt repositions self and ambulates w/walker well. Pt has orders to dc home, family at bedside. Dc instructions reviewed, iv removed cath intact, family gathering belongings. Pt instructed to continue to lay for several more minutes prior to getting up. No bleeding noted at iv site. Asked pt if she has had walker delivered, states she has a rollaitor walker at home, if that is ok. Told pt I would check with case management. Attempted to reach case management, managers in meeting unable to assist at this time. Updated pt on progress. Pt states she is ready to go home now and will buy her own walker. Arnie Bence from case management calls stating she will try to arrange walker for pt so she does not pay out of pocket.     Case management states home health will deliver walker and may use rollaid walker in the mean time

## 2021-07-12 NOTE — DISCHARGE SUMMARY
Discharge Summary    Patient: Jennifer Wilkerson MRN: [id number]  CSN: [id number]    YOB: 1975  Age: 50 y.o. Sex: female    DOA: 7/7/2021 LOS:  LOS: 5 days   Discharge Date:      Primary Care Provider:  Yasmin Villasenor MD    Admission Diagnoses: DJD (degenerative joint disease) of knee [M17.10]    Discharge Diagnoses:    Hospital Problems  Date Reviewed: 7/8/2021        Codes Class Noted POA    Syncope ICD-10-CM: R55  ICD-9-CM: 780.2  7/9/2021 Unknown        * (Principal) Status post total right knee replacement ICD-10-CM: Z96.651  ICD-9-CM: V43.65  7/8/2021 Unknown        Hypotension ICD-10-CM: I95.9  ICD-9-CM: 458.9  7/8/2021 Unknown        Morbid obesity ([address] ICD-10-CM: E66.01  ICD-9-CM: 278.01  Unknown Yes              Discharge Condition: stable     Discharge Medications:     Current Discharge Medication List      START taking these medications    Details   multivitamin, tx-iron-ca-min (THERA-M w/ IRON) 9 mg iron-400 mcg tab tablet Take 1 Tablet by mouth daily. Qty: 30 Tablet, Refills: 0  Start date: 7/13/2021      acetaminophen (TYLENOL) 325 mg tablet Take 2 Tablets by mouth every six (6) hours. Qty: 120 Tablet, Refills: 1  Start date: 7/8/2021      oxyCODONE IR (ROXICODONE) 5 mg immediate release tablet Take 1 Tablet by mouth every six (6) hours as needed for Pain for up to 14 days. Max Daily Amount: 20 mg.  Qty: 30 Tablet, Refills: 0  Start date: 7/8/2021, End date: 7/22/2021    Associated Diagnoses: Primary osteoarthritis of knee, unspecified laterality      aspirin 81 mg chewable tablet Take 2 Tablets by mouth daily. Qty: 60 Tablet, Refills: 0  Start date: 7/8/2021         CONTINUE these medications which have NOT CHANGED    Details   levothyroxine (SYNTHROID) 125 mcg tablet Take 125 mcg by mouth Daily (before breakfast). citalopram (CELEXA) 10 mg tablet Take 10 mg by mouth daily. famotidine (PEPCID) 40 mg tablet Take 40 mg by mouth daily.       cyanocobalamin (VITAMIN B-12) 1,000 mcg sublingual tablet 1,000 mcg by SubLINGual route daily. ascorbic acid, vitamin C, (Vitamin C) 500 mg tablet Take  by mouth. omeprazole (PRILOSEC) 40 mg capsule Take 40 mg by mouth nightly.      ergocalciferol (ERGOCALCIFEROL) 1,250 mcg (50,000 unit) capsule Vitamin D2 1,250 mcg (50,000 unit) capsule         STOP taking these medications       acetaminophen (Tylenol Arthritis Pain) 650 mg TbER Comments:   Reason for Stopping:               Procedures : rt knee replacement     Consults: Cardiology      PHYSICAL EXAM   Visit Vitals  /81 (BP 1 Location: Right upper arm, BP Patient Position: Sitting)   Pulse 73   Temp 97.7 °F (36.5 °C)   Resp 16   Ht 5' 5\" (1.651 m)   Wt 140.3 kg (309 lb 3.2 oz)   SpO2 100%   BMI 51.45 kg/m²     General: Awake, cooperative, no acute distress    HEENT: NC, Atraumatic. PERRLA, EOMI. Anicteric sclerae. Lungs:  CTA Bilaterally. No Wheezing/Rhonchi/Rales. Heart:  Regular  rhythm,  No murmur, No Rubs, No Gallops  Abdomen: Soft, Non distended, Non tender. +Bowel sounds,   Extremities: No c/c, rt knee mild swelling, surgical site covered with gauze   Psych:   Not anxious or agitated. Neurologic:  No acute neurological deficits. Admission HPI :   Nydia Rubi is a 50 y.o. female who has right knee DJD for TKR has failed non-op therapy       Hospital Course :   Nydia Rubi is a 50 y.o. female was admitted to orthopedics -Dr. Wild Jim MD services for right knee replacement. RRT was called, due to patient become unconsciousness after surgery. All work-up was negative negative including CTA chest no PE, CT pelvic and abdomen no infection. DVT negative. She has another syncope episode before discharge. She was found orthostatic hypotension. She received albumin and normal saline infusion. Her orthostatic hypotension resolved.   Patient was transferred to our service for Dr. Wild Jim request.  Before discharge she continued to remain hemodynamically stable. Dr. Wild Jim recommended aspirin for DVT prophylaxis. Cardiologist was consulted for recurrent syncope. No further work-up needed. Discharge planning discussed with patient, pt agrees  with the plan and no questions and concerns at this point. Activity: Activity as tolerated    Diet: Regular Diet    Follow-up: PCP Dr. Wild Jim    Disposition: home /rehab [facility]     Minutes spent on discharge: 45 min       Labs: Results:       Chemistry Recent Labs     07/12/21 0100 07/11/21  0215   * 87    142   K 3.1* 3.3*    106   CO2 29 27   BUN 7 7   CREA 0.82 0.68   CA 8.9 8.6   AGAP 6 9   BUCR 9* 10*   AP 65 60   TP 6.3* 6.0*   ALB 3.6 3.6   GLOB 2.7 2.4   AGRAT 1.3 1.5      CBC w/Diff Recent Labs     07/12/21 0100 07/11/21  1608 07/11/21  0215   WBC 9.8 9.9 10.1   RBC 2.92* 2.95* 2.73*   HGB 9.1* 9.0* 8.2*   HCT 27.2* 28.1* 25.5*    286 261   GRANS 52 54 48   LYMPH 32 33 37   EOS 7* 6* 6*      Cardiac Enzymes No results for input(s): CPK, CKND1, SARA in the last 72 hours. No lab exists for component: CKRMB, TROIP   Coagulation No results for input(s): PTP, INR, APTT, INREXT in the last 72 hours. Lipid Panel No results found for: CHOL, CHOLPOCT, CHOLX, CHLST, CHOLV, 390610, HDL, HDLP, LDL, LDLC, DLDLP, 003506, VLDLC, VLDL, TGLX, TRIGL, TRIGP, TGLPOCT, CHHD, CHHDX   BNP No results for input(s): BNPP in the last 72 hours. Liver Enzymes Recent Labs     07/12/21  0100   TP 6.3*   ALB 3.6   AP 65      Thyroid Studies Lab Results   Component Value Date/Time    TSH 1.99 07/07/2021 06:45 PM          @micro    Significant Diagnostic Studies: CT HEAD WO CONT    Result Date: 7/8/2021  EXAM: CT head INDICATION: Altered mental status. COMPARISON: None.  TECHNIQUE: Axial CT imaging of the head was performed without intravenous contrast. Dose reduction techniques used: automated exposure control, adjustment of the mAs and/or kVp according to patient size, and iterative reconstruction techniques. Digital imaging and communications in Medicine (DICOM) format image data are available to nonaffiliated external healthcare facilities or entities on a secure, media free, reciprocally searchable basis with patient authorization for at least 12 months after this study. _______________ FINDINGS: BRAIN AND POSTERIOR FOSSA: The sulci, folia, ventricles and basal cisterns are within normal limits for the patient's age. There is no intracranial hemorrhage, mass effect, or midline shift. There are no areas of abnormal parenchymal attenuation. EXTRA-AXIAL SPACES AND MENINGES: There are no abnormal extra-axial fluid collections. CALVARIUM: Intact. SINUSES: Clear. OTHER: None. _______________     No acute intracranial abnormalities. CTA CHEST W OR W WO CONT    Addendum Date: 7/8/2021    Addendum: The purpose of this addendum is to clarify impression point #3:   > Please note that there is a 1.6 cm hypodensity left thyroid nodule. This appears essentially unchanged, predominantly obscured by contrast on the prior study of 11/15/2020.   > Addendum discussed with Dr. Malcom Hancock at 11:00 AM, 7/8/2021. Result Date: 7/8/2021  EXAM: CTA chest INDICATION: Pain. COMPARISON: November 15, 2020. TECHNIQUE: Axial CT imaging from the thoracic inlet through the diaphragm with intravenous contrast. Coronal and sagittal MIP reformats were generated. Dose reduction techniques used: automated exposure control, adjustment of the mAs and/or kVp according to patient size, and iterative reconstruction techniques. Digital imaging and communications in Medicine (DICOM) format image data are available to nonaffiliated external healthcare facilities or entities on a secure, media free, reciprocally searchable basis with patient authorization for at least 12 months after this study. _______________ FINDINGS: EXAM QUALITY: Adequate. PULMONARY ARTERIES: No evidence of pulmonary embolism.  MEDIASTINUM: Normal heart size. No evidence of right heart strain. Aorta is unremarkable. No pericardial effusion. LUNGS: There is mild scarring at the left lung base. No suspicious nodule or mass. No abnormal opacities. PLEURA: Normal. AIRWAY: Normal. LYMPH NODES: No enlarged nodes. UPPER ABDOMEN: Unremarkable. OTHER: No acute or aggressive osseous abnormalities identified. There is a 1.6 cm low-density left thyroid nodule. _______________     1. No evidence of pulmonary embolism. 2.  No active cardiopulmonary disease. 3. 1.3 cm low-density left thyroid nodule. Recommend ultrasound follow-up. CT ABD PELV W CONT    Result Date: 7/8/2021  EXAM: CT of the abdomen and pelvis INDICATION: Pain. COMPARISON: None. TECHNIQUE: Axial CT imaging of the abdomen and pelvis was performed with intravenous contrast. Multiplanar reformats were generated. Dose reduction techniques used: automated exposure control, adjustment of the mAs and/or kVp according to patient size, and iterative reconstruction techniques. Digital imaging and communications in Medicine (DICOM) format image data are available to nonaffiliated external healthcare facilities or entities on a secure, media free, reciprocally searchable basis with patient authorization for at least 12 months after this study. _______________ FINDINGS: LOWER CHEST: There is minimal scarring at the left lung base. LIVER, BILIARY: Liver is normal. No biliary dilation. Gallbladder is unremarkable. PANCREAS: Normal. SPLEEN: Normal. ADRENALS: Normal. KIDNEYS: Normal. LYMPH NODES: No enlarged lymph nodes. GASTROINTESTINAL TRACT: There has been a prior gastric sleeve procedure. PELVIC ORGANS: Unremarkable. VASCULATURE: Unremarkable. BONES: No acute or aggressive osseous abnormalities identified. OTHER: None. _______________     No acute intra-abdominal process.     XR CHEST PORT    Result Date: 7/7/2021  EXAM:  AP Portable Chest X-ray 1 view INDICATION: Hypoxia COMPARISON: November 15, 2020 _______________ FINDINGS:  Heart and mediastinal contours are within normal limits for portable radiograph. Lungs are clear of active disease. There are no pleural effusions. No acute osseous findings. ________________      No acute process    ECHO ADULT COMPLETE    Result Date: 7/8/2021  · Left Ventricle: Normal cavity size, wall thickness and systolic function (ejection fraction normal). The estimated EF is 55 - 60%. There is mild (grade 1) left ventricular diastolic dysfunction E'E= 6.67. Wall Scoring: The left ventricular wall motion is normal. · Tricuspid Valve: Tricuspid valve not well visualized. No stenosis. Tricuspid regurgitation is inadequate for estimation of right ventricular systolic pressure. DUPLEX LOWER EXT VENOUS BILAT    Result Date: 7/8/2021  · No evidence of deep vein thrombosis in the right lower extremity. · No evidence of deep vein thrombosis in the left lower extremity. Limited visualization of right lower extremity due to extensive bandages from knee surgery.              [facility]     CC: Yesi Moore MD

## 2021-07-13 LAB
BACTERIA SPEC CULT: NORMAL
SERVICE CMNT-IMP: NORMAL

## 2021-08-03 PROBLEM — E66.01 MORBID OBESITY (HCC): Status: RESOLVED | Noted: 2021-08-03 | Resolved: 2021-08-03

## 2021-08-04 NOTE — PERIOP NOTES
EMR entered and reviewed by NABILA Parker RN, Clinical Coordinator of PACU for the purpose of chart review in the course of functions and responsibilities related to performance improvement.

## 2022-10-25 NOTE — PROGRESS NOTES
Hospitalist Progress Note    Patient: Dana Hernandez MRN: [id number]  CSN: [id number]    YOB: 1975  Age: 50 y.o. Sex: female    DOA: 7/7/2021 LOS:  LOS: 4 days            Patient Active Problem List   Diagnosis Code    Hypothyroid E03.9    Status post gastric bypass for obesity Z98.84    Intestinal malabsorption K90.9    Anxiety F41.9    Athyroidism (acquired) E03.9    Morbid obesity ([address] E66.01    Morbid obesity with body mass index (BMI) of 40.0 to 49.9 ([facility]) E66.01    Smoking history Z87.891    Status post total right knee replacement Z96.651    Hypotension I95.9    Syncope R55        IMPRESSION and Plan:    Dana Hernandez is a 50 y.o. female with   Patient Active Problem List    Diagnosis Date Noted    Syncope 07/09/2021    Status post total right knee replacement 07/08/2021    Hypotension 07/08/2021    Hypothyroid     Intestinal malabsorption     Anxiety     Athyroidism (acquired)     Morbid obesity ([address]     Morbid obesity with body mass index (BMI) of 40.0 to 49.9 ([address]     Smoking history     Status post gastric bypass for obesity 2005     Principal Problem:    Status post total right knee replacement (7/8/2021)    Active Problems: Morbid obesity ([address] ()      Hypotension (7/8/2021)      Syncope (7/9/2021)        unresponsiveness /near syncope /orthostatic hypotension  --- echo -- ef normal     Hypotension-improving  dc ivv and albumin     Anemia -- hgb 8.2  Down fron 10.2 yesterday. Check stool for hem occult and iron profile and b12     Morbid obesity        Continue PT/OT. Ambulate    Okay to dc later today or tomorrow if stable. D/w pt at [facility]    Patient's condition is fair        Recommend to continue hospitalization. Discussed with patient. Chief Complaints: No chief complaint on file. weakness  SUBJECTIVE:  Pt is seen and examined. Feels better.  Somewhat anxious to go home      Review of systems:    Review of Systems   Constitutional: Positive for malaise/fatigue. Negative for chills and fever. HENT: Negative. Negative for nosebleeds and sore throat. Eyes: Negative. Respiratory: Negative for shortness of breath and stridor. Cardiovascular: Negative for chest pain, palpitations, orthopnea and leg swelling. Gastrointestinal: Negative. Negative for abdominal pain, diarrhea and heartburn. Genitourinary: Negative for dysuria and hematuria. Skin: Negative. Neurological: Positive for weakness. Psychiatric/Behavioral: Negative for depression, substance abuse and suicidal ideas. The patient is not nervous/anxious. PE:  Patient Vitals for the past 24 hrs:   BP Temp Pulse Resp SpO2   07/11/21 0940 136/82  76  100 %   07/11/21 0929 117/77  65  100 %   07/11/21 0716 (!) 142/73 98.5 °F (36.9 °C) 75 16 100 %   07/11/21 0441 134/74 98.3 °F (36.8 °C) 70 16 100 %   07/10/21 2350 114/86 98.2 °F (36.8 °C) 75 16 100 %   07/10/21 1550 109/65 98.5 °F (36.9 °C) 75 16 100 %   07/10/21 1226 133/88 98.8 °F (37.1 °C) 82 18 100 %       Intake/Output Summary (Last 24 hours) at 7/11/2021 1048  Last data filed at 7/11/2021 0826  Gross per 24 hour   Intake    Output 200 ml   Net -200 ml     Patient Vitals for the past 120 hrs:   Weight   07/07/21 0745 129.9 kg (286 lb 6.4 oz)   07/07/21 2030 (P) 133.3 kg (293 lb 14 oz)   07/08/21 0854 129.7 kg (286 lb)         Physical Exam  Vitals and nursing note reviewed. Constitutional:       General: She is not in acute distress. Appearance: She is ill-appearing. Neck:      Vascular: No JVD. Cardiovascular:      Rate and Rhythm: Normal rate and regular rhythm. Heart sounds: Normal heart sounds. Pulmonary:      Effort: No respiratory distress. Breath sounds: Normal breath sounds. Abdominal:      General: Bowel sounds are normal. There is no distension. Palpations: Abdomen is soft. Tenderness: There is no abdominal tenderness. There is no rebound.    Musculoskeletal: General: Normal range of motion. Cervical back: Normal range of motion and neck supple. Skin:     General: Skin is warm and dry. Neurological:      Mental Status: She is alert and oriented to person, place, and time. Psychiatric:         Mood and Affect: Affect normal.             Intake and Output:  Current Shift:  07/11 0701 - 07/11 1900  In: -   Out: 200 [Urine:200]  Last three shifts:  07/09 1901 - 07/11 0700  In: -   Out: 950 [Urine:950]    Lab/Data Reviewed:  No results found for this or any previous visit (from the past 8 hour(s)).   Medications:  Current Facility-Administered Medications   Medication Dose Route Frequency    piperacillin-tazobactam (ZOSYN) 3.375 g in 0.9% sodium chloride (MBP/ADV) 100 mL MBP  3.375 g IntraVENous Q6H    multivitamin, tx-iron-ca-min (THERA-M w/ IRON) tablet 1 Tablet  1 Tablet Oral DAILY    [Held by provider] citalopram (CELEXA) tablet 10 mg  10 mg Oral DAILY    famotidine (PEPCID) tablet 40 mg  40 mg Oral DAILY    levothyroxine (SYNTHROID) tablet 125 mcg  125 mcg Oral ACB    pantoprazole (PROTONIX) tablet 40 mg  40 mg Oral ACB    sodium chloride (NS) flush 5-40 mL  5-40 mL IntraVENous Q8H    sodium chloride (NS) flush 5-40 mL  5-40 mL IntraVENous PRN    acetaminophen (TYLENOL) tablet 650 mg  650 mg Oral Q6H    [Held by provider] oxyCODONE IR (ROXICODONE) tablet 10 mg  10 mg Oral Q4H PRN    [Held by provider] HYDROmorphone (PF) (DILAUDID) injection 1 mg  1 mg IntraVENous Q4H PRN    [Held by provider] zolpidem (AMBIEN) tablet 5 mg  5 mg Oral QHS PRN    enoxaparin (LOVENOX) injection 40 mg  40 mg SubCUTAneous DAILY    oxyCODONE IR (ROXICODONE) tablet 5 mg  5 mg Oral Q6H PRN       Recent Results (from the past 24 hour(s))   CBC WITH AUTOMATED DIFF    Collection Time: 07/11/21  2:15 AM   Result Value Ref Range    WBC 10.1 4.6 - 13.2 K/uL    RBC 2.73 (L) 4.20 - 5.30 M/uL    HGB 8.2 (L) 12.0 - 16.0 g/dL    HCT 25.5 (L) 35.0 - 45.0 %    MCV 93.4 74.0 - 97.0 FL MCH 30.0 24.0 - 34.0 PG    MCHC 32.2 31.0 - 37.0 g/dL    RDW 15.9 (H) 11.6 - 14.5 %    PLATELET 425 039 - 657 K/uL    MPV 9.3 9.2 - 11.8 FL    NEUTROPHILS 48 40 - 73 %    LYMPHOCYTES 37 21 - 52 %    MONOCYTES 9 3 - 10 %    EOSINOPHILS 6 (H) 0 - 5 %    BASOPHILS 0 0 - 2 %    ABS. NEUTROPHILS 4.9 1.8 - 8.0 K/UL    ABS. LYMPHOCYTES 3.7 (H) 0.9 - 3.6 K/UL    ABS. MONOCYTES 0.9 0.05 - 1.2 K/UL    ABS. EOSINOPHILS 0.6 (H) 0.0 - 0.4 K/UL    ABS. BASOPHILS 0.0 0.0 - 0.1 K/UL    DF AUTOMATED     MAGNESIUM    Collection Time: 07/11/21  2:15 AM   Result Value Ref Range    Magnesium 2.1 1.6 - 2.6 mg/dL   METABOLIC PANEL, COMPREHENSIVE    Collection Time: 07/11/21  2:15 AM   Result Value Ref Range    Sodium 142 136 - 145 mmol/L    Potassium 3.3 (L) 3.5 - 5.5 mmol/L    Chloride 106 100 - 111 mmol/L    CO2 27 21 - 32 mmol/L    Anion gap 9 3.0 - 18 mmol/L    Glucose 87 74 - 99 mg/dL    BUN 7 7.0 - 18 MG/DL    Creatinine 0.68 0.6 - 1.3 MG/DL    BUN/Creatinine ratio 10 (L) 12 - 20      GFR est AA >60 >60 ml/min/1.73m2    GFR est non-AA >60 >60 ml/min/1.73m2    Calcium 8.6 8.5 - 10.1 MG/DL    Bilirubin, total 0.6 0.2 - 1.0 MG/DL    ALT (SGPT) 22 13 - 56 U/L    AST (SGOT) 19 10 - 38 U/L    Alk.  phosphatase 60 45 - 117 U/L    Protein, total 6.0 (L) 6.4 - 8.2 g/dL    Albumin 3.6 3.4 - 5.0 g/dL    Globulin 2.4 2.0 - 4.0 g/dL    A-G Ratio 1.5 0.8 - 1.7     PHOSPHORUS    Collection Time: 07/11/21  2:15 AM   Result Value Ref Range    Phosphorus 2.9 2.5 - 4.9 MG/DL       Procedures/imaging: see electronic medical records for all procedures/Xrays and details which were not copied into this note but were reviewed prior to creation of Raudel Bui MD   7/11/2021, 11:20 AM no

## (undated) DEVICE — HANDPIECE SET WITH HIGH FLOW TIP AND SUCTION TUBE: Brand: INTERPULSE

## (undated) DEVICE — GARMENT COMPR M FOR 13IN FT INTMIT SGL BLDR HEM FORC II

## (undated) DEVICE — PACK PROCEDURE SURG TOT KNEE CUST

## (undated) DEVICE — NEEDLE HYPO 18GA L1.5IN PNK POLYPR HUB S STL THN WALL FILL

## (undated) DEVICE — Device

## (undated) DEVICE — BIPOLAR SEALER 23-301-1 AQM MBS: Brand: AQUAMANTYS™

## (undated) DEVICE — CONCISE CEMENT SCULPS KIT: Brand: CONCISE

## (undated) DEVICE — CLOSURE SKIN FLX NONINVASIVE PRELOC TECHNOLOGY FOR 24IN

## (undated) DEVICE — 3 BONE CEMENT MIXER: Brand: MIXEVAC

## (undated) DEVICE — SOLUTION IV 100ML 0.9% SOD CHL DIL INJ

## (undated) DEVICE — SUTURE VCRL 2-0 L27IN ABSRB UD OS-6 L36MM 1/2 CIR REV CUT J533H

## (undated) DEVICE — STERILE POLYISOPRENE POWDER-FREE SURGICAL GLOVES: Brand: PROTEXIS

## (undated) DEVICE — ZIMMER® STERILE DISPOSABLE TOURNIQUET CUFF WITH PROTECTIVE SLEEVE AND PLC, SINGLE PORT, SINGLE BLADDER, 34 IN. (86 CM)

## (undated) DEVICE — PREP SKN CHLRAPRP APL 26ML STR --

## (undated) DEVICE — SOL INJ L R 1000ML BG --

## (undated) DEVICE — SUTURE PDS + SZ 1 L96IN ABSRB VLT L65MM TP-1 1/2 CIR PDP880G

## (undated) DEVICE — WATERPROOF, BACTERIA PROOF DRESSING WITH ABSORBENT SEE THROUGH PAD: Brand: OPSITE POST-OP VISIBLE 30X10CM CTN 20

## (undated) DEVICE — STERILE POLYISOPRENE POWDER-FREE SURGICAL GLOVES WITH EMOLLIENT COATING: Brand: PROTEXIS

## (undated) DEVICE — BLADE SAW 1.27X90 MM FOR LG BNE [EZ2513PM62STE] [KOMET MEDICAL]